# Patient Record
Sex: FEMALE | Race: WHITE | NOT HISPANIC OR LATINO | Employment: FULL TIME | URBAN - NONMETROPOLITAN AREA
[De-identification: names, ages, dates, MRNs, and addresses within clinical notes are randomized per-mention and may not be internally consistent; named-entity substitution may affect disease eponyms.]

---

## 2023-09-01 ENCOUNTER — APPOINTMENT (EMERGENCY)
Dept: CT IMAGING | Facility: HOSPITAL | Age: 33
DRG: 125 | End: 2023-09-01
Payer: COMMERCIAL

## 2023-09-01 ENCOUNTER — HOSPITAL ENCOUNTER (INPATIENT)
Facility: HOSPITAL | Age: 33
LOS: 1 days | DRG: 125 | End: 2023-09-03
Attending: EMERGENCY MEDICINE | Admitting: INTERNAL MEDICINE
Payer: COMMERCIAL

## 2023-09-01 DIAGNOSIS — R53.1 ACUTE RIGHT-SIDED WEAKNESS: Primary | ICD-10-CM

## 2023-09-01 DIAGNOSIS — H53.8 BLURRY VISION, BILATERAL: ICD-10-CM

## 2023-09-01 PROBLEM — R29.90 STROKE-LIKE SYMPTOMS: Status: ACTIVE | Noted: 2023-09-01

## 2023-09-01 LAB
2HR DELTA HS TROPONIN: 0 NG/L
ANION GAP SERPL CALCULATED.3IONS-SCNC: 7 MMOL/L
APTT PPP: 27 SECONDS (ref 23–37)
BUN SERPL-MCNC: 8 MG/DL (ref 5–25)
CALCIUM SERPL-MCNC: 9.6 MG/DL (ref 8.4–10.2)
CARDIAC TROPONIN I PNL SERPL HS: 3 NG/L
CARDIAC TROPONIN I PNL SERPL HS: 3 NG/L
CHLORIDE SERPL-SCNC: 103 MMOL/L (ref 96–108)
CO2 SERPL-SCNC: 27 MMOL/L (ref 21–32)
CREAT SERPL-MCNC: 0.63 MG/DL (ref 0.6–1.3)
CRP SERPL QL: 6.6 MG/L
ERYTHROCYTE [DISTWIDTH] IN BLOOD BY AUTOMATED COUNT: 12.9 % (ref 11.6–15.1)
FLUAV RNA RESP QL NAA+PROBE: NEGATIVE
FLUBV RNA RESP QL NAA+PROBE: NEGATIVE
GFR SERPL CREATININE-BSD FRML MDRD: 119 ML/MIN/1.73SQ M
GLUCOSE SERPL-MCNC: 107 MG/DL (ref 65–140)
GLUCOSE SERPL-MCNC: 96 MG/DL (ref 65–140)
HCT VFR BLD AUTO: 47.7 % (ref 34.8–46.1)
HGB BLD-MCNC: 15.1 G/DL (ref 11.5–15.4)
INR PPP: 0.88 (ref 0.84–1.19)
MCH RBC QN AUTO: 29.4 PG (ref 26.8–34.3)
MCHC RBC AUTO-ENTMCNC: 31.7 G/DL (ref 31.4–37.4)
MCV RBC AUTO: 93 FL (ref 82–98)
PLATELET # BLD AUTO: 234 THOUSANDS/UL (ref 149–390)
PMV BLD AUTO: 10 FL (ref 8.9–12.7)
POTASSIUM SERPL-SCNC: 3.7 MMOL/L (ref 3.5–5.3)
PROTHROMBIN TIME: 12.2 SECONDS (ref 11.6–14.5)
RBC # BLD AUTO: 5.13 MILLION/UL (ref 3.81–5.12)
RSV RNA RESP QL NAA+PROBE: NEGATIVE
SARS-COV-2 RNA RESP QL NAA+PROBE: NEGATIVE
SODIUM SERPL-SCNC: 137 MMOL/L (ref 135–147)
TSH SERPL DL<=0.05 MIU/L-ACNC: 2.06 UIU/ML (ref 0.45–4.5)
WBC # BLD AUTO: 9.01 THOUSAND/UL (ref 4.31–10.16)

## 2023-09-01 PROCEDURE — 70498 CT ANGIOGRAPHY NECK: CPT

## 2023-09-01 PROCEDURE — 99285 EMERGENCY DEPT VISIT HI MDM: CPT | Performed by: EMERGENCY MEDICINE

## 2023-09-01 PROCEDURE — 99285 EMERGENCY DEPT VISIT HI MDM: CPT

## 2023-09-01 PROCEDURE — 85610 PROTHROMBIN TIME: CPT

## 2023-09-01 PROCEDURE — 99223 1ST HOSP IP/OBS HIGH 75: CPT | Performed by: NURSE PRACTITIONER

## 2023-09-01 PROCEDURE — 80048 BASIC METABOLIC PNL TOTAL CA: CPT

## 2023-09-01 PROCEDURE — 36415 COLL VENOUS BLD VENIPUNCTURE: CPT

## 2023-09-01 PROCEDURE — 0241U HB NFCT DS VIR RESP RNA 4 TRGT: CPT

## 2023-09-01 PROCEDURE — 84443 ASSAY THYROID STIM HORMONE: CPT | Performed by: NURSE PRACTITIONER

## 2023-09-01 PROCEDURE — 84484 ASSAY OF TROPONIN QUANT: CPT

## 2023-09-01 PROCEDURE — 82607 VITAMIN B-12: CPT | Performed by: NURSE PRACTITIONER

## 2023-09-01 PROCEDURE — 85027 COMPLETE CBC AUTOMATED: CPT

## 2023-09-01 PROCEDURE — 82948 REAGENT STRIP/BLOOD GLUCOSE: CPT

## 2023-09-01 PROCEDURE — 86140 C-REACTIVE PROTEIN: CPT | Performed by: NURSE PRACTITIONER

## 2023-09-01 PROCEDURE — 93005 ELECTROCARDIOGRAM TRACING: CPT

## 2023-09-01 PROCEDURE — 70496 CT ANGIOGRAPHY HEAD: CPT

## 2023-09-01 PROCEDURE — 85730 THROMBOPLASTIN TIME PARTIAL: CPT

## 2023-09-01 RX ORDER — ATORVASTATIN CALCIUM 40 MG/1
40 TABLET, FILM COATED ORAL EVERY EVENING
Status: DISCONTINUED | OUTPATIENT
Start: 2023-09-02 | End: 2023-09-03

## 2023-09-01 RX ORDER — SODIUM CHLORIDE 9 MG/ML
75 INJECTION, SOLUTION INTRAVENOUS CONTINUOUS
Status: DISCONTINUED | OUTPATIENT
Start: 2023-09-01 | End: 2023-09-02

## 2023-09-01 RX ORDER — ASPIRIN 81 MG/1
81 TABLET, CHEWABLE ORAL DAILY
Status: DISCONTINUED | OUTPATIENT
Start: 2023-09-02 | End: 2023-09-03

## 2023-09-01 RX ORDER — ENOXAPARIN SODIUM 100 MG/ML
40 INJECTION SUBCUTANEOUS DAILY
Status: DISCONTINUED | OUTPATIENT
Start: 2023-09-02 | End: 2023-09-03 | Stop reason: HOSPADM

## 2023-09-01 RX ADMIN — SODIUM CHLORIDE 75 ML/HR: 0.9 INJECTION, SOLUTION INTRAVENOUS at 22:32

## 2023-09-01 NOTE — QUICK NOTE
Stroke Alert Note   Issac Liriano 34 y.o. female  MRN: [id number]   Unit/Bed#: ED 03 Encounter: [id number]     Stroke alert text received at: 6:10pm  Call from  received at: 6:11pm  Neurology response by phone was immediate    33F with no significant medical history on no home medications presented as a stroke alert due to new onset bilateral blurry vision and right-sided weakness. In the ED she reported that she developed blurry vision while at work suddenly around 6 AM.  The seem to involve the entirety of her vision in both eyes, which she initially blamed on eyestrain looking at the computer screen for too long and fatigue. Then, around 3pm developed sudden onset right arm weakness/heaviness and the right side of her face felt weird. She did not initially report any symptoms in her legs, but her sister reported that patient tripped on the way into the ED. Prior to 11 AM she had a normal day, and has been in her usual state of health with no recent illnesses or injuries. Exam by the ED provider found that her pupils were equal, round, and reactive, visual fields were full with eyes tested separately and together. Visual acuity was 20/50 with both eyes open, and 20/70 on the right and left with each eye tested independently. She was also found to have mild weakness in the right arm including , with mild drift. The rest of her exam was reported to be normal.  BP on arrival was 141/97. She reports no change in her symptoms since they began. Per the ED provider she denied any eye pain, pain with eye movements, light sensitivity, or any other eye/ocular symptoms. She did endorse a mild headache, however. She does not take any medications normally, nor has she taken any this week. CBC, BMP, coags, were normal.  CRP mildly elevated at 6.6    NIHSSS 1 for right arm drift    CT head wo: Normal, no acute findings. CTA head/neck: Normal, no significant stenosis.     IV TNK was not given due to being outside the window for administration, and her symptoms were not clearly due to stroke. Based on the sudden onset of her symptoms, first the blurry vision, and then several hours later the right-sided weakness, stroke needs to be considered, but the modest loss of visual bilateral acuity would be atypical for vision loss due to ischemia, which is typically severe. MS could be considered, but symptoms in MS are not sudden onset like this. This degree and description of vision loss seems to be more consistent with than ocular/ophthalmic etiology. I did discuss her case with an on-call ophthalmologist, who suggested that new onset diabetes could cause similar vision loss, however, her glucose was normal.  - admit for additional work-up, but she may need to be transferred for ophthalmology evaluation if her vision worsens, and if not, she will likely need to be seen by ophthalmology ASAP after discharge. - Admit on stroke pathway  - Recommend loading with aspirin 325mg once, then continuing 81 mg daily,  - start lipitor 40mg Qday  - MRI brain and orbits w/wo, echo, lipid panel, HbA1c  - permissive HTN to 220/110 for 24 hours, monitor on telemetry  - normothermia, euglycemia  - avoid hypotonic fluids.       Chet Gonzalez MD

## 2023-09-01 NOTE — ED PROVIDER NOTES
History  Chief Complaint   Patient presents with   • STROKE Alert     1100 started with blurred vision and weakness around 1500hr. Stroke alert called at 200     68-year-old female with no past medical history presenting to the emergency room with chief complaint of reporting what she describes as "blurry vision" around 11 AM while at work today. Patient was working on a computer screen. She thought she was just "tired."  She tried to work on papers but had difficulty doing that as well. Around 3 PM she started to notice that she was having right arm heaviness and "weakness."  She also report that the right side of her face felt what she describes as "weird."  Her sister who accompanied her to the emergency room reports that the patient did stumble coming into the emergency department. She denies any recent illness or recent head trauma. She does report previous history of concussion. No chest pain or shortness of breath. No reported fevers or chills. History provided by:  Patient and relative  STROKE Alert  Location:  Blurry vision and right arm weakness  Severity:  Severe  Associated symptoms: no fatigue, no fever and no headaches        None       No past medical history on file. No past surgical history on file. No family history on file. I have reviewed and agree with the history as documented. No existing history information found. No existing history information found. Review of Systems   Constitutional: Negative. Negative for fatigue and fever. Eyes: Positive for visual disturbance. Negative for photophobia and pain. Respiratory: Negative. Cardiovascular: Negative. Gastrointestinal: Negative. Neurological: Positive for weakness and numbness. Negative for dizziness, seizures, syncope, facial asymmetry, speech difficulty, light-headedness and headaches. All other systems reviewed and are negative.       Physical Exam  Physical Exam  Vitals and nursing note reviewed. Constitutional:       General: She is awake. Appearance: Normal appearance. She is well-developed. She is obese. She is not ill-appearing or toxic-appearing. HENT:      Head: Normocephalic and atraumatic. Right Ear: External ear normal.      Left Ear: External ear normal.      Nose: Nose normal.      Mouth/Throat:      Mouth: Mucous membranes are moist.   Eyes:      General: Lids are normal. No scleral icterus. Extraocular Movements: Extraocular movements intact. Pupils: Pupils are equal, round, and reactive to light. Slit lamp exam:     Right eye: Anterior chamber quiet. Left eye: Anterior chamber quiet. Visual Fields: Right eye visual fields normal and left eye visual fields normal.      Comments: Visual acuity:  OS: 20/70  OD: 20/70  OU: 20/50   Cardiovascular:      Rate and Rhythm: Normal rate and regular rhythm. Heart sounds: Normal heart sounds. No murmur heard. Pulmonary:      Effort: Pulmonary effort is normal. No respiratory distress. Breath sounds: Normal breath sounds. No wheezing, rhonchi or rales. Abdominal:      General: Abdomen is flat. There is no distension. Palpations: Abdomen is soft. Tenderness: There is no abdominal tenderness. There is no guarding or rebound. Musculoskeletal:         General: No swelling, tenderness or deformity. Normal range of motion. Cervical back: Normal range of motion and neck supple. Skin:     General: Skin is warm and dry. Coloration: Skin is not jaundiced or pale. Findings: No rash. Neurological:      Mental Status: She is alert and oriented to person, place, and time.       Comments: See NIH   Psychiatric:         Attention and Perception: Attention normal.         Mood and Affect: Mood normal.         Speech: Speech normal.         Behavior: Behavior normal.         Vital Signs  ED Triage Vitals [09/01/23 1809]   Temperature Pulse Respirations Blood Pressure SpO2   97.7 °F (36.5 °C) 102 18 141/97 98 %      Temp Source Heart Rate Source Patient Position - Orthostatic VS BP Location FiO2 (%)   Temporal Monitor Sitting Left arm --      Pain Score       No Pain           Vitals:    09/01/23 1845 09/01/23 1900 09/01/23 1915 09/01/23 1930   BP: 131/74 120/75 126/70 122/72   Pulse: 80 76 73 77   Patient Position - Orthostatic VS:             Visual Acuity  Visual Acuity    Flowsheet Row Most Recent Value   Visual acuity R eye is --  [20/400]   Visual acuity Left eye is 20/200   Wearing corrective eyewear/lenses? No   L Pupil Size (mm) 3   R Pupil Size (mm) 3          ED Medications  Medications - No data to display    Diagnostic Studies  Results Reviewed     Procedure Component Value Units Date/Time    HS Troponin I 4hr [[id number]]     Lab Status: No result Specimen: Blood     FLU/RSV/COVID - if FLU/RSV clinically relevant [[id number]]  (Normal) Collected: 09/01/23 1823    Lab Status: Final result Specimen: Nares from Nose Updated: 09/01/23 1908     SARS-CoV-2 Negative     INFLUENZA A PCR Negative     INFLUENZA B PCR Negative     RSV PCR Negative    Narrative:      FOR PEDIATRIC PATIENTS - copy/paste COVID Guidelines URL to browser: [email or web address]    SARS-CoV-2 assay is a Nucleic Acid Amplification assay intended for the  qualitative detection of nucleic acid from SARS-CoV-2 in nasopharyngeal  swabs. Results are for the presumptive identification of SARS-CoV-2 RNA. Positive results are indicative of infection with SARS-CoV-2, the virus  causing COVID-19, but do not rule out bacterial infection or co-infection  with other viruses. Laboratories within the [facility] and its  territories are required to report all positive results to the appropriate  public health authorities. Negative results do not preclude SARS-CoV-2  infection and should not be used as the sole basis for treatment or other  patient management decisions. Negative results must be combined with  clinical observations, patient history, and epidemiological information. This test has not been FDA cleared or approved. This test has been authorized by FDA under an Emergency Use Authorization  (EUA). This test is only authorized for the duration of time the  declaration that circumstances exist justifying the authorization of the  emergency use of an in vitro diagnostic tests for detection of SARS-CoV-2  virus and/or diagnosis of COVID-19 infection under section 564(b)(1) of  the Act, 21 U. S.C. 761CZS-7(V)(9), unless the authorization is terminated  or revoked sooner. The test has been validated but independent review by FDA  and CLIA is pending. Test performed using [employer] Gene[employer]pert: This RT-PCR assay targets N2,  a region unique to SARS-CoV-2. A conserved region in the E-gene was chosen  for pan-Sarbecovirus detection which includes SARS-CoV-2. According to CMS-2020-01-R, this platform meets the definition of high-throughput technology.     HS Troponin I 2hr [[id number]]     Lab Status: No result Specimen: Blood     HS Troponin 0hr (reflex protocol) [[id number]]  (Normal) Collected: 09/01/23 1821    Lab Status: Final result Specimen: Blood from Arm, Left Updated: 09/01/23 1855     hs TnI 0hr 3 ng/L     Fingerstick Glucose (POCT) [[id number]]  (Normal) Collected: 09/01/23 1822    Lab Status: Final result Updated: 09/01/23 1849     POC Glucose 107 mg/dl     Basic metabolic panel [[id number]] Collected: 09/01/23 1821    Lab Status: Final result Specimen: Blood from Arm, Left Updated: 09/01/23 1848     Sodium 137 mmol/L      Potassium 3.7 mmol/L      Chloride 103 mmol/L      CO2 27 mmol/L      ANION GAP 7 mmol/L      BUN 8 mg/dL      Creatinine 0.63 mg/dL      Glucose 96 mg/dL      Calcium 9.6 mg/dL      eGFR 119 ml/min/1.73sq m     Narrative:      [place] guidelines for Chronic Kidney Disease (CKD):   •  Stage 1 with normal or high GFR (GFR > 90 mL/min/1.73 square meters)  •  Stage 2 Mild CKD (GFR = 60-89 mL/min/1.73 square meters)  •  Stage 3A Moderate CKD (GFR = 45-59 mL/min/1.73 square meters)  •  Stage 3B Moderate CKD (GFR = 30-44 mL/min/1.73 square meters)  •  Stage 4 Severe CKD (GFR = 15-29 mL/min/1.73 square meters)  •  Stage 5 End Stage CKD (GFR <15 mL/min/1.73 square meters)  Note: GFR calculation is accurate only with a steady state creatinine    Protime-INR [[id number]]  (Normal) Collected: 09/01/23 1821    Lab Status: Final result Specimen: Blood from Arm, Left Updated: 09/01/23 1844     Protime 12.2 seconds      INR 0.88    APTT [[id number]]  (Normal) Collected: 09/01/23 1821    Lab Status: Final result Specimen: Blood from Arm, Left Updated: 09/01/23 1844     PTT 27 seconds     CBC and Platelet [[id number]]  (Abnormal) Collected: 09/01/23 1821    Lab Status: Final result Specimen: Blood from Arm, Left Updated: 09/01/23 1831     WBC 9.01 Thousand/uL      RBC 5.13 Million/uL      Hemoglobin 15.1 g/dL      Hematocrit 47.7 %      MCV 93 fL      MCH 29.4 pg      MCHC 31.7 g/dL      RDW 12.9 %      Platelets 395 Thousands/uL      MPV 10.0 fL                  CTA stroke alert (head/neck)   Final Result by Vani Harding DO (09/01 1834)      No carotid or vertebral artery stenosis         No focal intracranial stenosis or aneurysm. Findings were directly communicated with Shankar Edouard via [facility] text at 6:32 p.m. on 9/1/2023. Workstation performed: [id number]         CT stroke alert brain   Final Result by Vani Harding DO (09/01 1817)      No acute intracranial abnormality. Findings were directly discussed with Shankar Edouard at 6:15 p.m. on 9/1/2023.       Workstation performed: [id number]                    Procedures  ECG 12 Lead Documentation Only    Date/Time: 9/1/2023 6:45 PM    Performed by: Jenfier Cid DO  Authorized by: Jenifer Cid DO    Indications / Diagnosis:  Stroke alert  ECG reviewed by me, the ED Provider: yes    Patient location:  ED  Previous ECG:     Previous ECG:  Unavailable  Interpretation:     Interpretation: normal    Rate:     ECG rate assessment: normal    Rhythm:     Rhythm: sinus rhythm    Ectopy:     Ectopy: none    QRS:     QRS axis:  Normal  Conduction:     Conduction: normal    ST segments:     ST segments:  Normal  T waves:     T waves: normal               ED Course  ED Course as of 09/01/23 2006   Fri Sep 01, 2023   1816 Discussed with Dr. Martir Gilmore. 1923 CT imaging has been negative. NIH stroke scale is 1. Consulting with neurology. 2004 Dr. Martir Gilmore is consulting with ophthalmology. Marilia the case with medicine. They will admit the patient to their service. 2004 Patient is currently having no change in her symptomatology. Stroke Assessment     Row Name 09/01/23 1830             NIH Stroke Scale    Interval Baseline      Level of Consciousness (1a.) 0      LOC Questions (1b.) 0      LOC Commands (1c.) 0      Best Gaze (2.) 0      Visual (3.) 0      Facial Palsy (4.) 0      Motor Arm, Left (5a.) 0      Motor Arm, Right (5b.) 1      Motor Leg, Left (6a.) 0      Motor Leg, Right (6b.) 0      Limb Ataxia (7.) 0      Sensory (8.) 0      Best Language (9.) 0      Dysarthria (10.) 0      Extinction and Inattention (11.) (Formerly Neglect) 0      Total 1              Flowsheet Row Most Recent Value   Thrombolytic Decision Options    Thrombolytic Decision Patient not a candidate. Patient is not a candidate options Unclear time of onset outside appropriate time window. Medical Decision Making  Patient presented to the emergency department and a MSE was performed. The patient was evaluated for complaint related to acute strokelike symptoms.   Patient is potentially at risk for, but not limited to, thrombotic stroke, embolic stroke, hemorrhagic stroke, hypertensive emergency, hypoglycemic episode, or migraine variant. Several of these diagnoses have been evaluated and ruled out by history and physical.  As needed, patient will be further evaluated with laboratory and imaging studies. Higher level diagnostics, such as CT imaging or ultrasound, may also be required. Please see work-up portion of the note for further evaluation of patient's risk. Socioeconomic factors were also considered as part of the decision-making process. Unless otherwise stated in the chart or patient is admitted as elsewhere documented, any previously prescribed medications will be maintained. Acute right-sided weakness: complicated acute illness or injury with systemic symptoms  Blurry vision, bilateral: complicated acute illness or injury with systemic symptoms  Amount and/or Complexity of Data Reviewed  Labs: ordered. Risk  Decision regarding hospitalization. Risk Details: Patient presented to the emergency department and a MSE was performed. The patient was evaluated and diagnosed with acute blurred vision and right upper extremity weakness. This is a new issue that will require additional planned work-up and treatment in a hospitalized setting. As may have been required as part of this evaluation, clinical laboratory test, radiology imaging and medical testing (I.e. EKG) were ordered as necessitated by the patient's presentation. I independently reviewed these studies, imaging and testing. This patient's case is considered to be a considerable risk secondary to the above listed disease process and poses a threat to the patient's well-being and baseline function. Further in-patient diagnostic testing and management, which may include the administration of parenteral medications, is required.           Disposition  Final diagnoses:   Acute right-sided weakness   Blurry vision, bilateral     Time reflects when diagnosis was documented in both MDM as applicable and the Disposition within this note     Time User Action Codes Description Comment    9/1/2023  6:16 PM Bart Shorts Add [R53.1] Acute right-sided weakness     9/1/2023  6:32 PM Bart Shorts Add [H53.8] Blurry vision, bilateral       ED Disposition     ED Disposition   Admit    Condition   Stable    Date/Time   Fri Sep 1, 2023  8:04 PM    Comment   --         Follow-up Information    None         Patient's Medications    No medications on file       No discharge procedures on file.     PDMP Review     None          ED Provider  Electronically Signed by           Adolph Walker DO  09/01/23 2006

## 2023-09-02 LAB
25(OH)D3 SERPL-MCNC: 15.4 NG/ML (ref 30–100)
ALBUMIN SERPL BCP-MCNC: 3.7 G/DL (ref 3.5–5)
ALP SERPL-CCNC: 67 U/L (ref 34–104)
ALT SERPL W P-5'-P-CCNC: 25 U/L (ref 7–52)
ANION GAP SERPL CALCULATED.3IONS-SCNC: 5 MMOL/L
AST SERPL W P-5'-P-CCNC: 26 U/L (ref 13–39)
BASOPHILS # BLD AUTO: 0.02 THOUSANDS/ÂΜL (ref 0–0.1)
BASOPHILS NFR BLD AUTO: 0 % (ref 0–1)
BILIRUB SERPL-MCNC: 0.53 MG/DL (ref 0.2–1)
BUN SERPL-MCNC: 9 MG/DL (ref 5–25)
CALCIUM SERPL-MCNC: 8.6 MG/DL (ref 8.4–10.2)
CHLORIDE SERPL-SCNC: 107 MMOL/L (ref 96–108)
CHOLEST SERPL-MCNC: 182 MG/DL
CO2 SERPL-SCNC: 26 MMOL/L (ref 21–32)
CREAT SERPL-MCNC: 0.67 MG/DL (ref 0.6–1.3)
EOSINOPHIL # BLD AUTO: 0.22 THOUSAND/ÂΜL (ref 0–0.61)
EOSINOPHIL NFR BLD AUTO: 3 % (ref 0–6)
ERYTHROCYTE [DISTWIDTH] IN BLOOD BY AUTOMATED COUNT: 13.2 % (ref 11.6–15.1)
GFR SERPL CREATININE-BSD FRML MDRD: 117 ML/MIN/1.73SQ M
GLUCOSE SERPL-MCNC: 95 MG/DL (ref 65–140)
HCG SERPL QL: NEGATIVE
HCT VFR BLD AUTO: 40 % (ref 34.8–46.1)
HDLC SERPL-MCNC: 34 MG/DL
HGB BLD-MCNC: 12.5 G/DL (ref 11.5–15.4)
IMM GRANULOCYTES # BLD AUTO: 0.01 THOUSAND/UL (ref 0–0.2)
IMM GRANULOCYTES NFR BLD AUTO: 0 % (ref 0–2)
LDLC SERPL CALC-MCNC: 117 MG/DL (ref 0–100)
LYMPHOCYTES # BLD AUTO: 1.75 THOUSANDS/ÂΜL (ref 0.6–4.47)
LYMPHOCYTES NFR BLD AUTO: 27 % (ref 14–44)
MAGNESIUM SERPL-MCNC: 2.1 MG/DL (ref 1.9–2.7)
MCH RBC QN AUTO: 29.3 PG (ref 26.8–34.3)
MCHC RBC AUTO-ENTMCNC: 31.3 G/DL (ref 31.4–37.4)
MCV RBC AUTO: 94 FL (ref 82–98)
MONOCYTES # BLD AUTO: 0.35 THOUSAND/ÂΜL (ref 0.17–1.22)
MONOCYTES NFR BLD AUTO: 5 % (ref 4–12)
NEUTROPHILS # BLD AUTO: 4.14 THOUSANDS/ÂΜL (ref 1.85–7.62)
NEUTS SEG NFR BLD AUTO: 65 % (ref 43–75)
NRBC BLD AUTO-RTO: 0 /100 WBCS
PLATELET # BLD AUTO: 193 THOUSANDS/UL (ref 149–390)
PMV BLD AUTO: 10.1 FL (ref 8.9–12.7)
POTASSIUM SERPL-SCNC: 3.8 MMOL/L (ref 3.5–5.3)
PROT SERPL-MCNC: 6.2 G/DL (ref 6.4–8.4)
RBC # BLD AUTO: 4.26 MILLION/UL (ref 3.81–5.12)
SODIUM SERPL-SCNC: 138 MMOL/L (ref 135–147)
TRIGL SERPL-MCNC: 157 MG/DL
VIT B12 SERPL-MCNC: 222 PG/ML (ref 180–914)
WBC # BLD AUTO: 6.49 THOUSAND/UL (ref 4.31–10.16)

## 2023-09-02 PROCEDURE — 84703 CHORIONIC GONADOTROPIN ASSAY: CPT | Performed by: NURSE PRACTITIONER

## 2023-09-02 PROCEDURE — 83036 HEMOGLOBIN GLYCOSYLATED A1C: CPT | Performed by: NURSE PRACTITIONER

## 2023-09-02 PROCEDURE — 97163 PT EVAL HIGH COMPLEX 45 MIN: CPT

## 2023-09-02 PROCEDURE — 82306 VITAMIN D 25 HYDROXY: CPT | Performed by: NURSE PRACTITIONER

## 2023-09-02 PROCEDURE — 83735 ASSAY OF MAGNESIUM: CPT | Performed by: NURSE PRACTITIONER

## 2023-09-02 PROCEDURE — 80061 LIPID PANEL: CPT | Performed by: NURSE PRACTITIONER

## 2023-09-02 PROCEDURE — 99232 SBSQ HOSP IP/OBS MODERATE 35: CPT | Performed by: INTERNAL MEDICINE

## 2023-09-02 PROCEDURE — 97167 OT EVAL HIGH COMPLEX 60 MIN: CPT

## 2023-09-02 PROCEDURE — 85025 COMPLETE CBC W/AUTO DIFF WBC: CPT | Performed by: NURSE PRACTITIONER

## 2023-09-02 PROCEDURE — 80053 COMPREHEN METABOLIC PANEL: CPT | Performed by: NURSE PRACTITIONER

## 2023-09-02 RX ORDER — ACETAMINOPHEN 325 MG/1
650 TABLET ORAL EVERY 6 HOURS PRN
Status: DISCONTINUED | OUTPATIENT
Start: 2023-09-02 | End: 2023-09-03 | Stop reason: HOSPADM

## 2023-09-02 RX ADMIN — ENOXAPARIN SODIUM 40 MG: 40 INJECTION SUBCUTANEOUS at 09:59

## 2023-09-02 RX ADMIN — ACETAMINOPHEN 650 MG: 325 TABLET, FILM COATED ORAL at 21:53

## 2023-09-02 RX ADMIN — ATORVASTATIN CALCIUM 40 MG: 40 TABLET, FILM COATED ORAL at 17:03

## 2023-09-02 RX ADMIN — ASPIRIN 81 MG 81 MG: 81 TABLET ORAL at 09:58

## 2023-09-02 NOTE — OCCUPATIONAL THERAPY NOTE
Occupational Therapy Evaluation     Patient Name: Mohinder Royal  VBWIL'U Date: 9/2/2023  Problem List  Principal Problem:    Stroke-like symptoms    Past Medical History  Past Medical History:   Diagnosis Date    Concussion      Past Surgical History  Past Surgical History:   Procedure Laterality Date    KNEE ARTHROSCOPY           09/02/23 3757   Note Type   Note type Evaluation   Pain Assessment   Pain Assessment Tool 0-10   Pain Score No Pain   Home Living   Type of Home House  (Split level- 4 steps up to main floor and 4 steps down to basement)   Home Layout Two level;Bed/bath upstairs   Bathroom Shower/Tub Walk-in shower   Bathroom Toilet Standard   Home Equipment   (No AD PTA)   Additional Comments Pt reporting living in split level home. Pt not using AD for mobility PTA. Prior Function   Level of [place] Independent with ADLs; Independent with functional mobility; Independent with IADLS   Lives With Spouse  (3 children)   Receives Help From Family   IADLs Independent with driving; Independent with meal prep   Falls in the last 6 months 0   Vocational Full time employment   Comments Pt IND with ADLs, IADLs, transfers, mobility, driving and working full time. ADL   LB Dressing Assistance 5  Supervision/Setup   LB Dressing Deficit Setup;Supervision/safety; Don/doff R sock; Don/doff L sock; Increased time to complete   Additional Comments Pt able to don socks while sitting EOB with SUP for safety during distal reach. Based on functional abilities assessed, pt demo ability to complete UB ADLs with setup and LB ADLs with SUP. Bed Mobility   Supine to Sit 7  Independent   Additional Comments Pt completed supine to sit bed mobility with HOB flat and no use of bed rails IND. Transfers   Sit to Stand 5  Supervision   Stand to Sit 5  Supervision   Stand pivot 5  Supervision   Additional Comments Pt completed all functional transfers without use of AD and SUP for safety due to R sided weakness.    Balance Static Sitting Normal   Dynamic Sitting Normal   Static Standing Good   Dynamic Standing Fair +   Activity Tolerance   Activity Tolerance Patient tolerated treatment well   Nurse Made Aware RN   RUE Assessment   RUE Assessment WFL   RUE Strength   RUE Overall Strength Deficits  (3+/5 BUE strength. Noticable deficits at all joints in comparison to LUE. Adequate strength to complete ADLs.)   LUE Assessment   LUE Assessment WFL   LUE Strength   LUE Overall Strength Within Functional Limits - able to perform ADL tasks with strength  (4/5 MMT)   Hand Function   Hand Function Comments Pt presenting with decreased strength and [place] in R hand. Sensation   Light Touch No apparent deficits   Vision-Basic Assessment   Current Vision Wears glasses only for reading   Vision - Complex Assessment   Ocular Range of Motion Intact   Tracking Intact   Convergence   (WFL)   Visual Fields   [facility])   Acuity Able to read clock/calendar on wall without difficulty   Additional Comments Pt continues to report "blurred" vision in both eyes but has noticed some improvement. ROM, tacking, peripheral vision, visual fields WFL. Perception   Inattention/Neglect Appears intact   Cognition   Overall Cognitive Status WFL   Arousal/Participation Alert; Responsive; Cooperative   Attention Within functional limits   Orientation Level Oriented X4   Memory Within functional limits   Following Commands Follows all commands and directions without difficulty   Assessment   Limitation Decreased ADL status; Decreased UE strength;Decreased endurance;Decreased high-level ADLs; Decreased self-care trans   Prognosis Good   Assessment Pt is a 34 y.o. female, admitted to [address] [place] [address] 9/1/2023 d/t experiencing blurred vision, numbness of her nose, metallic taste in mouth, R sided favial and UE weakness and heaviness. Dx: stroke-like symptoms. Pt with PMHx impacting their performance during ADL tasks, including: concussion.  Prior to admission to the hospital Pt was performing ADLs without physical assistance. IADLs without physical assistance. Functional transfers/ambulation without physical assistance. Cognitive status was PTA was WNL. OT order placed to assess Pt's ADLs, cognitive status, and performance during functional tasks in order to maximize safety and independence while making most appropriate d/c recommendations. Pt's clinical presentation is currently unstable/unpredictable given new onset deficits that effect Pt's occupational performance and ability to safely return to PLOF including decrease activity tolerance, decrease standing balance, decrease performance during ADL tasks, decrease UB MS, decrease generalized strength, decrease performance during functional transfers, decrease FM control and steps to enter home. Personal factors affecting Pt at time of initial evaluation include: step(s) to enter environment, multi-level environment, inability to perform current job functions, inability to perform IADLs and inability to perform ADLs. Pt will benefit from continued skilled OT services to address deficits as defined above and to maximize level independence/participation during ADLs and functional tasks to facilitate return toward PLOF and improved quality of life. From an occupational therapy standpoint, recommendation at time of d/c would be outpatient OT if visual and/or R sided weakness symptoms do not resolve. Plan   Treatment Interventions ADL retraining;Functional transfer training;UE strengthening/ROM; Endurance training;Patient/family training;Neuromuscular reeducation; Fine motor coordination activities; Energy conservation; Activityengagement   Goal Expiration Date 09/16/23   OT Frequency 1-2x/wk   Recommendation   OT Discharge Recommendation Home with outpatient rehabilitation  (Recommend outpatient OT if strength and visual deficits do not resolve by discharge. If symptoms resolve, no rehab needs. )   AM-PAC Daily Activity Inpatient   Lower Body Dressing 3 Bathing 3   Toileting 3   Upper Body Dressing 4   Grooming 4   Eating 4   Daily Activity Raw Score 21   Daily Activity Standardized Score (Calc for Raw Score >=11) 44.27   AM-PAC Applied Cognition Inpatient   Following a Speech/Presentation 4   Understanding Ordinary Conversation 4   Taking Medications 4   Remembering Where Things Are Placed or Put Away 4   Remembering List of 4-5 Errands 4   Taking Care of Complicated Tasks 4   Applied Cognition Raw Score 24   Applied Cognition Standardized Score 62.21     The patient's raw score on the AM-PAC Daily Activity inpatient short form is 21, standardized score is 44.27, greater than 39.4. Patients at this level are likely to benefit from DC to home. Please refer to the recommendation of the Occupational Therapist for safe DC planning. Pt goals to be met by 9/16/23. Pt will demonstrate ability to complete UB ADLs including washing/dressing @ IND in order to increase performance and participation during meaningful tasks  Pt will demonstrate ability to complete LB dressing @ IND in order to increase safety and independence during meaningful tasks. Pt will demonstrate ability to elijah/doff socks/shoes while sitting EOB @ IND in order to increase safety and independence during meaningful tasks. Pt will demonstrate ability to complete toileting tasks including CM and pericare @ IND in order to increase safety and independence during meaningful tasks. Pt will demonstrate ability to complete EOB, chair, toilet/commode transfers @ IND in order to increase performance and participation during functional tasks. Pt will demonstrate ability to stand for 10 minutes while maintaining G balance without use of AD for UB support PRN. Pt will demonstrate ability to tolerate 30-35 minute OT session with no vc'ing for deep breathing or use of energy conservation techniques in order to increase activity tolerance during functional tasks.    Pt will demonstrate Good carryover of use of energy conservation/compensatory strategies during ADLs and functional tasks in order to increase safety and reduce risk for falls. Pt will demonstrate Good attention and participation in continued evaluation of functional ambulation house hold distances in order to assist with safe d/c planning. Pt will identify 3 areas of interest/hobbies and 1 intervention on how to incorporate into daily life in order to increase interaction with environment and peers as well as increase participation in meaningful tasks. Pt will demonstrate 100% carryover of BUE HEP in order to increase BUE MS and increase performance during functional tasks upon d/c home.     Heriberto Parra, OTR/L

## 2023-09-02 NOTE — PROGRESS NOTES
[facility]  Progress Note  Name: Yair Maldonado I  MRN: [id number]  Unit/Bed#: -01 I Date of Admission: 9/1/2023   Date of Service: 9/2/2023 I Hospital Day: 0    Assessment/Plan   * Stroke-like symptoms  Assessment & Plan  • POA beginning at 11 AM developed bilateral blurred vision while looking at computer while at work then at 3 PM developed nose numbness, metallic taste in mouth followed by right-sided facial heaviness and right upper extremity weakness. Right upper extremity weakness and blurred vision persist at time of admission. • Admit to Stroke Pathway  • NIH 1- No TNK per neurology  • CT/CTA brain without ischemia, infarct, bleed, lesion or LVO  • Aspirin 325 mg loaded in ED then 81 mg daily   • Atorvastatin 40 mg daily  • Lipid panel reviewed. • Telemetry monitoring did not reveal any tacky or bradycardia arrhythmias. • -200 permissive hypertension  • Follow-up on echocardiogram with bubble study  • Follow-up on Mri brain w/o contrast   • Flp, tsh, hba1c, b12, tsh, vit D noted   • speech/PT/OT eval, case management  • Appreciate formal neurology consultation                 VTE Pharmacologic Prophylaxis:   High Risk (Score >/= 5) - Pharmacological DVT Prophylaxis Ordered: enoxaparin (Lovenox). Sequential Compression Devices Ordered. Patient Centered Rounds: I performed bedside rounds with nursing staff today. Discussions with Specialists or Other Care Team Provider: Discussed with neurology  Education and Discussions with Family / Patient: Patient declined call to .      Total Time Spent on Date of Encounter in care of patient: 35 minutes This time was spent on one or more of the following: performing physical exam; counseling and coordination of care; obtaining or reviewing history; documenting in the medical record; reviewing/ordering tests, medications or procedures; communicating with other healthcare professionals and discussing with patient's family/caregivers. Current Length of Stay: 0 day(s)  Current Patient Status: Observation   Certification Statement: The patient will continue to require additional inpatient hospital stay due to Await stroke work-up  Discharge Plan: Anticipate discharge in 24-48 hrs to home. Code Status: Level 1 - Full Code    Subjective:   Patient seen and examined at bedside. Denies any headache and reports improvement in vision bilaterally. Denies any speech or swallowing difficulty or facial droop. Still with some mild right upper extremity weakness however improved than on admission. Denies any right upper extremity numbness or weakness of any other extremity. Remains hemodynamically stable. Objective:     Vitals:   Temp (24hrs), Av.9 °F (36.6 °C), Min:97.7 °F (36.5 °C), Max:98.1 °F (36.7 °C)    Temp:  [97.7 °F (36.5 °C)-98.1 °F (36.7 °C)] 97.7 °F (36.5 °C)  HR:  [] 79  Resp:  [14-20] 18  BP: (110-141)/(60-97) 112/67  SpO2:  [93 %-100 %] 98 %  Body mass index is 38.09 kg/m². Input and Output Summary (last 24 hours): Intake/Output Summary (Last 24 hours) at 2023 1137  Last data filed at 2023 1005  Gross per 24 hour   Intake 857.5 ml   Output 500 ml   Net 357.5 ml       Physical Exam:   Physical Exam  Constitutional:       General: She is not in acute distress. HENT:      Head: Normocephalic. Nose: Nose normal.      Mouth/Throat:      Mouth: Mucous membranes are moist.   Eyes:      Pupils: Pupils are equal, round, and reactive to light. Cardiovascular:      Rate and Rhythm: Normal rate and regular rhythm. Pulses: Normal pulses. Pulmonary:      Effort: Pulmonary effort is normal.      Breath sounds: Normal breath sounds. Abdominal:      General: Abdomen is flat. Bowel sounds are normal.      Palpations: Abdomen is soft. Musculoskeletal:      Cervical back: Neck supple. Right lower leg: No edema. Skin:     General: Skin is warm.    Neurological:      Mental Status: She is alert and oriented to person, place, and time. Cranial Nerves: No cranial nerve deficit. Sensory: No sensory deficit. Motor: Weakness present. Coordination: Coordination normal.      Gait: Gait abnormal.      Deep Tendon Reflexes: Reflexes normal.      Comments: Mild right upper  extremity weakness   Psychiatric:         Mood and Affect: Mood normal.        Additional Data:     Labs:  Results from last 7 days   Lab Units 09/02/23  0457   WBC Thousand/uL 6.49   HEMOGLOBIN g/dL 12.5   HEMATOCRIT % 40.0   PLATELETS Thousands/uL 193   NEUTROS PCT % 65   LYMPHS PCT % 27   MONOS PCT % 5   EOS PCT % 3     Results from last 7 days   Lab Units 09/02/23  0457   SODIUM mmol/L 138   POTASSIUM mmol/L 3.8   CHLORIDE mmol/L 107   CO2 mmol/L 26   BUN mg/dL 9   CREATININE mg/dL 0.67   ANION GAP mmol/L 5   CALCIUM mg/dL 8.6   ALBUMIN g/dL 3.7   TOTAL BILIRUBIN mg/dL 0.53   ALK PHOS U/L 67   ALT U/L 25   AST U/L 26   GLUCOSE RANDOM mg/dL 95     Results from last 7 days   Lab Units 09/01/23  1821   INR  0.88     Results from last 7 days   Lab Units 09/01/23  1822   POC GLUCOSE mg/dl 107               Lines/Drains:  Invasive Devices     Peripheral Intravenous Line  Duration           Peripheral IV 09/01/23 Left Antecubital <1 day                  Telemetry:  Telemetry Orders (From admission, onward)             24 Hour Telemetry Monitoring  Continuous x 24 Hours (Telem)        Question:  Reason for 24 Hour Telemetry  Answer:  TIA/Suspected CVA/ Confirmed CVA                 Telemetry Reviewed: Normal Sinus Rhythm  Indication for Continued Telemetry Use: No indication for continued use. Will discontinue.               Imaging: Reviewed radiology reports from this admission including: CT head    Recent Cultures (last 7 days):         Last 24 Hours Medication List:   Current Facility-Administered Medications   Medication Dose Route Frequency Provider Last Rate   • aspirin  81 mg Oral Daily Krissy Small CRNP     • atorvastatin  40 mg Oral QPM Krissy S Geovanny, CRNP     • enoxaparin  40 mg Subcutaneous Daily Krissy S Geovanny, CRNP     • sodium chloride  75 mL/hr Intravenous Continuous Krissy S Geovanny, CRNP Stopped (09/02/23 4766)        Today, Patient Was Seen By: Cristina Wooten MD    **Please Note: This note may have been constructed using a voice recognition system. **

## 2023-09-02 NOTE — ASSESSMENT & PLAN NOTE
• POA beginning at 11 AM developed bilateral blurred vision while looking at computer while at work then at 3 PM developed nose numbness, metallic taste in mouth followed by right-sided facial heaviness and right upper extremity weakness. Right upper extremity weakness and blurred vision persist at time of admission.   • Admit to Stroke Pathway  • NIH 1- No TNK per neurology  • CT/CTA brain without ischemia, infarct, bleed, lesion or LVO  • Aspirin 325 mg loaded in ED then 81 mg daily   • Atorvastatin 40 mg daily  • Telemetry monitoring  • -200 permissive hypertension  • Echocardiogram with bubble study  • Mri brain w/o contrast when able  • Flp, tsh, hba1c, b12, tsh, vit D pending  • Speech/PT/OT eval, case management  • Appreciate formal neurology consultation

## 2023-09-02 NOTE — CASE MANAGEMENT
Case Management Assessment & Discharge Planning Note    Patient name Neptali Page  Location /-31 MRN [id number]  : 1990 Date 2023       Current Admission Date: 2023  Current Admission Diagnosis:Stroke-like symptoms   Patient Active Problem List    Diagnosis Date Noted   • Stroke-like symptoms 2023      LOS (days): 0  Geometric Mean LOS (GMLOS) (days):   Days to GMLOS:     OBJECTIVE:              Current admission status: Observation  Referral Reason:  (ischemic stroke protocol)    Preferred Pharmacy:   [address] [place] [address], [address] [place] [address] [address]  [place] [address]  Phone: [phone] Fax: [phone]    Primary Care Provider: Rachelle Grimaldo    Primary Insurance: BLUE CROSS  Secondary Insurance:     CM met with patient at the bedside,baseline information  was obtained. CM discussed the role of CM in helping the patient develop a discharge plan and assist the patient in carry out their plan. Discussed  In rounds with provider, nursing and CM  Plan is pending at this time, MRI is warranted. Per therapy patient will be good for dc with OP PT services. CM met with patient at the bedside,baseline information  was obtained. CM discussed the role of CM in helping the patient develop a discharge plan and assist the patient in carry out their plan. ASSESSMENT:  Active Health Care Proxies    There are no active Health Care Proxies on file.        Advance Directives  Does patient have a [address]?: No  Was patient offered paperwork?: Yes (declined)  Does patient currently have a Health Care decision maker?: Yes, please see Health Care Proxy section  Does patient have Advance Directives?: No  Was patient offered paperwork?: Yes (declined)  Primary Contact: Triston Gipson Spouse         Readmission Root Cause  30 Day Readmission: No    Patient Information  Admitted from[de-identified] Home  Mental Status: Alert  During Assessment patient was accompanied by: Not accompanied during assessment  Assessment information provided by[de-identified] Patient  Primary Caregiver: Self  Support Systems: Spouse/significant other, Family members  [place] of Residence: [address] [place] [address] do you live in?: [facility] entry access options.  Select all that apply.: Stairs  Number of steps to enter home.: 4  Do the steps have railings?: Yes  Type of Current Residence: Bi-level (4 up and 4 down from the front door)  Upon entering residence, is there a bedroom on the main floor (no further steps)?: Yes  Upon entering residence, is there a bathroom on the main floor (no further steps)?: Yes  In the last 12 months, was there a time when you were not able to pay the mortgage or rent on time?: No  In the last 12 months, how many places have you lived?: 1  In the last 12 months, was there a time when you did not have a steady place to sleep or slept in a shelter (including now)?: No  Homeless/housing insecurity resource given?: No  Living Arrangements: Lives w/ Spouse/significant other, Other (Comment) (Lives with)  Is patient a ?: No    Activities of Daily Living Prior to Admission  Functional Status: Independent  Completes ADLs independently?: Yes  Ambulates independently?: Yes  Does patient use assisted devices?: No  Does patient currently own DME?: No  Does patient have a history of Outpatient Therapy (PT/OT)?: No  Does the patient have a history of Short-Term Rehab?: No  Does patient have a history of HHC?: No  Does patient currently have [facility]?: No         Patient Information Continued  Income Source: Employed  Does patient have prescription coverage?: Yes  Within the past 12 months, you worried that your food would run out before you got the money to buy more.: Never true  Within the past 12 months, the food you bought just didn't last and you didn't have money to get more.: Never true  Food insecurity resource given?: No  Does patient receive dialysis treatments?: No  Does patient have a history of Mental Health Diagnosis?: Yes  Is patient receiving treatment for mental health?:  (NO has anxiety, tried medications, now doing herbal and natural remedies.)  Has patient received inpatient treatment related to mental health in the last 2 years?: No         Means of Transportation  Means of Transport to Appts[de-identified] Drives Self  In the past 12 months, has lack of transportation kept you from medical appointments or from getting medications?: No  In the past 12 months, has lack of transportation kept you from meetings, work, or from getting things needed for daily living?: No  Was application for public transport provided?: No        DISCHARGE DETAILS:    Discharge planning discussed with[de-identified] patient  Freedom of Choice: Yes     CM contacted family/caregiver?: No- see comments (declined)                  [facility]          Is the patient interested in [facility] at discharge?: No    DME Referral Provided  Referral made for DME?: No    Other Referral/Resources/Interventions Provided:  Interventions: Outpatient PT  Referral Comments: Provided list of [facility] for OP PT/OT services. Reviewed with patient, the recommendation was made for OP PT/OT services. Provided patient with list of [facility]'s providers and informed patient they can go to there provider of choice. Treatment Team Recommendation: Home  Discharge Destination Plan[de-identified] Home (Home with oP PT)      CM will continue to follow for any additional needs.

## 2023-09-02 NOTE — PLAN OF CARE
Problem: PAIN - ADULT  Goal: Verbalizes/displays adequate comfort level or baseline comfort level  Description: Interventions:  - Encourage patient to monitor pain and request assistance  - Assess pain using appropriate pain scale  - Administer analgesics based on type and severity of pain and evaluate response  - Implement non-pharmacological measures as appropriate and evaluate response  - Consider cultural and social influences on pain and pain management  - Notify physician/advanced practitioner if interventions unsuccessful or patient reports new pain  Outcome: Progressing     Problem: INFECTION - ADULT  Goal: Absence or prevention of progression during hospitalization  Description: INTERVENTIONS:  - Assess and monitor for signs and symptoms of infection  - Monitor lab/diagnostic results  - Monitor all insertion sites, i.e. indwelling lines, tubes, and drains  - Monitor endotracheal if appropriate and nasal secretions for changes in amount and color  - [place] appropriate cooling/warming therapies per order  - Administer medications as ordered  - Instruct and encourage patient and family to use good hand hygiene technique  - Identify and instruct in appropriate isolation precautions for identified infection/condition  Outcome: Progressing  Goal: Absence of fever/infection during neutropenic period  Description: INTERVENTIONS:  - Monitor WBC    Outcome: Progressing     Problem: SAFETY ADULT  Goal: Patient will remain free of falls  Description: INTERVENTIONS:  - Educate patient/family on patient safety including physical limitations  - Instruct patient to call for assistance with activity   - Consult OT/PT to assist with strengthening/mobility   - Keep Call bell within reach  - Keep bed low and locked with side rails adjusted as appropriate  - Keep care items and personal belongings within reach  - Initiate and maintain comfort rounds  - Apply yellow socks and bracelet for high fall risk patients  - Consider moving patient to room near nurses station  Outcome: Progressing     Problem: DISCHARGE PLANNING  Goal: Discharge to home or other facility with appropriate resources  Description: INTERVENTIONS:  - Identify barriers to discharge w/patient and caregiver  - Arrange for needed discharge resources and transportation as appropriate  - Identify discharge learning needs (meds, wound care, etc.)  - Arrange for interpretive services to assist at discharge as needed  - Refer to Case Management Department for coordinating discharge planning if the patient needs post-hospital services based on physician/advanced practitioner order or complex needs related to functional status, cognitive ability, or social support system  Outcome: Progressing     Problem: Knowledge Deficit  Goal: Patient/family/caregiver demonstrates understanding of disease process, treatment plan, medications, and discharge instructions  Description: Complete learning assessment and assess knowledge base. Interventions:  - Provide teaching at level of understanding  - Provide teaching via preferred learning methods  Outcome: Progressing     Problem: Neurological Deficit  Goal: Neurological status is stable or improving  Description: Interventions:  - Monitor and assess patient's level of consciousness, motor function, sensory function, and level of assistance needed for ADLs. - Monitor and report changes from baseline. Collaborate with interdisciplinary team to initiate plan and implement interventions as ordered. - Provide and maintain a safe environment. - Consider seizure precautions. - Consider fall precautions. - Consider aspiration precautions. - Consider bleeding precautions.   Outcome: Progressing

## 2023-09-02 NOTE — ASSESSMENT & PLAN NOTE
• POA beginning at 11 AM developed bilateral blurred vision while looking at computer while at work then at 3 PM developed nose numbness, metallic taste in mouth followed by right-sided facial heaviness and right upper extremity weakness. Right upper extremity weakness and blurred vision persist at time of admission. • Admit to Stroke Pathway  • NIH 1- No TNK per neurology  • CT/CTA brain without ischemia, infarct, bleed, lesion or LVO  • Aspirin 325 mg loaded in ED then 81 mg daily   • Atorvastatin 40 mg daily  • Lipid panel reviewed. • Telemetry monitoring did not reveal any tacky or bradycardia arrhythmias.   • -200 permissive hypertension  • Follow-up on echocardiogram with bubble study  • Follow-up on Mri brain w/o contrast   • Flp, tsh, hba1c, b12, tsh, vit D noted   • speech/PT/OT eval, case management  • Appreciate formal neurology consultation

## 2023-09-02 NOTE — PLAN OF CARE
Problem: OCCUPATIONAL THERAPY ADULT  Goal: Performs self-care activities at highest level of function for planned discharge setting. See evaluation for individualized goals. Description: Treatment Interventions: ADL retraining, Functional transfer training, UE strengthening/ROM, Endurance training, Patient/family training, Neuromuscular reeducation, Fine motor coordination activities, Energy conservation, Activityengagement          See flowsheet documentation for full assessment, interventions and recommendations. Note: Limitation: Decreased ADL status, Decreased UE strength, Decreased endurance, Decreased high-level ADLs, Decreased self-care trans  Prognosis: Good  Assessment: Pt is a 34 y.o. female, admitted to [address] [place] [address] 9/1/2023 d/t experiencing blurred vision, numbness of her nose, metallic taste in mouth, R sided favial and UE weakness and heaviness. Dx: stroke-like symptoms. Pt with PMHx impacting their performance during ADL tasks, including: concussion. Prior to admission to the hospital Pt was performing ADLs without physical assistance. IADLs without physical assistance. Functional transfers/ambulation without physical assistance. Cognitive status was PTA was WNL. OT order placed to assess Pt's ADLs, cognitive status, and performance during functional tasks in order to maximize safety and independence while making most appropriate d/c recommendations. Pt's clinical presentation is currently unstable/unpredictable given new onset deficits that effect Pt's occupational performance and ability to safely return to OF including decrease activity tolerance, decrease standing balance, decrease performance during ADL tasks, decrease UB MS, decrease generalized strength, decrease performance during functional transfers, decrease FM control and steps to enter home.  Personal factors affecting Pt at time of initial evaluation include: step(s) to enter environment, multi-level environment, inability to perform current job functions, inability to perform IADLs and inability to perform ADLs. Pt will benefit from continued skilled OT services to address deficits as defined above and to maximize level independence/participation during ADLs and functional tasks to facilitate return toward PLOF and improved quality of life. From an occupational therapy standpoint, recommendation at time of d/c would be outpatient OT if visual and/or R sided weakness symptoms do not resolve. OT Discharge Recommendation: Home with outpatient rehabilitation (Recommend outpatient OT if strength and visual deficits do not resolve by discharge.  If symptoms resolve, no rehab needs.)

## 2023-09-02 NOTE — PLAN OF CARE
Problem: PHYSICAL THERAPY ADULT  Goal: Performs mobility at highest level of function for planned discharge setting. See evaluation for individualized goals. Description: Treatment/Interventions: Functional transfer training, LE strengthening/ROM, Elevations, Therapeutic exercise, Gait training, Spoke to nursing, OT  Equipment Recommended:  (none)       See flowsheet documentation for full assessment, interventions and recommendations. 9/2/2023 1414 by Guanakito Herrera PT  Note: Prognosis: Excellent  Problem List: Decreased strength, Obesity  Assessment: Pt is a 34 y.o. female seen for PT evaluation s/p admission to [address] [place] [address] on 9/1/2023 with Stroke-like symptoms. Order placed for PT services. Upon evaluation: Pt is presenting with impaired functional mobility due to decreased strength and gait deviations requiring  S assistance for ambulation with no ad . Pt's clinical presentation is currently unstable/unpredictable given the functional mobility deficits above, especially weakness and gait deviations, coupled with fall risks as indicated by AM-PAC 6-Clicks: 02/21 as well as obesity and combined with medical complications of need for input for mobility technique/safety and new onset of R sided wekaness/deficits and blurred vision. Pt's PMHx and comorbidities that may affect physical performance and progress include: concussion. Personal factors affecting pt at time of IE include: step(s) to enter environment, multi-level environment, inability to perform current job functions and inability to perform IADLs. Pt will benefit from continued skilled PT services to address deficits as defined above and to maximize level of functional mobility to facilitate return toward PLOF and improved QOL.  From PT/mobility standpoint, recommendation at time of d/c would be OPPT pending progress in order to reduce fall risk and maximize pt's functional independence and consistency with mobility in order to facilitate return to PLOF. Recommend ther ex next 1-2 sessions. Barriers to Discharge: None  Barriers to Discharge Comments: Patient has supportive spouse  PT Discharge Recommendation: Home with outpatient rehabilitation    See flowsheet documentation for full assessment.

## 2023-09-02 NOTE — PHYSICAL THERAPY NOTE
PHYSICAL THERAPY EVALUATION  NAME:  Bertin Bernard  DATE: 09/02/23    AGE:   34 y.o. Mrn:   [id number]  ADMIT DX:  Blurred vision [H53.8]  Acute right-sided weakness [R53.1]  Blurry vision, bilateral [H53.8]    Past Medical History:   Diagnosis Date   • Concussion      Length Of Stay: 0  Performed at least 2 patient identifiers during session: Name and Birthday  PHYSICAL THERAPY EVALUATION :    09/02/23 1313   PT Last Visit   PT Visit Date 09/02/23   Note Type   Note type Evaluation   Pain Assessment   Pain Assessment Tool 0-10   Home Living   Type of Home House  (Split level home with 4 steps to main floor and 4 steps to basement)   Home Layout Two level;Bed/bath upstairs;Stairs to enter without rails  (4 BILL without HR)   Bathroom Shower/Tub Walk-in shower   Bathroom Toilet Standard   Home Equipment   (none)   Additional Comments Patient reports living in a split level home with 4 Bill w/o HR. 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 Patient drive, rides horses and works F/T as a manager.   Patient is R hand dominant   Cognition   Overall Cognitive Status WFL   Arousal/Participation Cooperative   Attention Within functional limits   Orientation Level Oriented X4   Memory Within functional limits   Following Commands Follows all commands and directions without difficulty   Subjective   Subjective "I didn't think i would be staying here when I came into the ER"  "My right arm is better, but still weak"   RLE Assessment   RLE Assessment WNL   Strength RLE   RLE Overall Strength 3+/5  (Patient demonstrates weakness at all joints R V L and graded at 3+/5. Patient strength is sufficient for safe funcitonal mobility)   LLE Assessment   LLE Assessment WNL  (4+/5)   Light Touch   RLE Light Touch Grossly intact   LLE Light Touch Grossly intact   Bed Mobility   Additional Comments Patient OOB at start of session. BM N/T D/T same   Transfers   Sit to Stand 5  Supervision   Stand to Sit 5  Supervision   Stand pivot 5  Supervision   Additional Comments Patient completed all funcitonal transfers including STS from bedside chair, toilet and SPT @ S/Ds with good safety awareness   Ambulation/Elevation   Gait pattern Decreased foot clearance   Gait Assistance 5  Supervision   Additional items Verbal cues   Assistive Device None   Distance Patient ambulated distances > 300' without Ad and S. Patient noted to have slight decreased foot clearance R, however, adequate for safe ambulation. patietn provided with VC's for increased awareness of same. Stair Management Assistance 5  Supervision   Stair Management Technique One rail R  (step over step)   Number of Stairs 13   Balance   Static Sitting Normal   Dynamic Sitting Good   Static Standing Fair +   Dynamic Standing Fair +   Activity Tolerance   Activity Tolerance Patient tolerated treatment well   Medical Staff Made Aware Spoke to OT [address] [place] [address] spoke to JOAQUIN Jernigan   Assessment   Prognosis Excellent   Problem List Decreased strength;Obesity   Barriers to Discharge None   Barriers to Discharge Comments Patient has supportive spouse   Goals   Patient Goals "i need to get home to my boys"   STG Expiration Date 09/16/23   Plan   Treatment/Interventions Functional transfer training;LE strengthening/ROM; Elevations; Therapeutic exercise;Gait training;Spoke to nursing;OT   PT Frequency 3-5x/wk   Recommendation   PT Discharge Recommendation Home with outpatient rehabilitation   Equipment Recommended   (none)   AM-PAC Basic Mobility Inpatient   Turning in Flat Bed Without Bedrails 4   Lying on Back to Sitting on Edge of Flat Bed Without Bedrails 4   Moving Bed to Chair 4   Standing Up From Chair Using Arms 4   Walk in Room 3   Climb 3-5 Stairs With Railing 3   Basic Mobility Inpatient Raw Score 22   Basic Mobility Standardized Score 47.4   Highest Level Of Mobility   JH-HLM Goal 7: Walk 25 feet or more   JH-HLM Achieved 8: Walk 250 feet ot more   End of Consult   Patient Position at End of Consult Bedside chair;Bed/Chair alarm activated     (Please find full objective findings from PT assessment regarding body systems outlined above). Assessment: Pt is a 34 y.o. female seen for PT evaluation s/p admission to [address] [place] [address] on 9/1/2023 with Stroke-like symptoms. Order placed for PT services. Upon evaluation: Pt is presenting with impaired functional mobility due to decreased strength and gait deviations requiring  S assistance for ambulation with no ad . Pt's clinical presentation is currently unstable/unpredictable given the functional mobility deficits above, especially weakness and gait deviations, coupled with fall risks as indicated by AM-PAC 6-Clicks: 32/92 as well as obesity and combined with medical complications of need for input for mobility technique/safety and new onset of R sided wekaness/deficits and blurred vision. Pt's PMHx and comorbidities that may affect physical performance and progress include: concussion. Personal factors affecting pt at time of IE include: step(s) to enter environment, multi-level environment, inability to perform current job functions and inability to perform IADLs. Pt will benefit from continued skilled PT services to address deficits as defined above and to maximize level of functional mobility to facilitate return toward PLOF and improved QOL.  From PT/mobility standpoint, recommendation at time of d/c would be OPPT pending progress in order to reduce fall risk and maximize pt's functional independence and consistency with mobility in order to facilitate return to PLOF. Recommend ther ex next 1-2 sessions. The patient's AM-PAC Basic Mobility Inpatient Short Form Raw Score is 22. A Raw score of greater than 16 suggests the patient may benefit from discharge to home with OPPT. Please also refer to the recommendation of the Physical Therapist for safe discharge planning. Goals: Pt will:. Pt will perform transfers with I to increase Indep in prior living environment and prepare for ambulation. Pt will ambulate with no AD for >/= 500 with  I  to resume PLOF and to access home environment. Pt will complete >/= 15 steps with with unilateral handrail with I to return to home with BILL and return to multilevel home. Pt will participate in objective balance assessment to determine baseline fall risk. Pt will increase B LE strength >/= 1/2 MMT grade to facilitate functional mobility. Increase R LE strength 1 grade to facilitate independent mobility.      Jagruti Call, PT,MSPT

## 2023-09-02 NOTE — H&P
[facility]  H&P  Name: Terra Alexander 34 y.o. female I MRN: [id number]  Unit/Bed#: -01 I Date of Admission: 9/1/2023   Date of Service: 9/1/2023 I Hospital Day: 0      Assessment/Plan   * Stroke-like symptoms  Assessment & Plan  • POA beginning at 11 AM developed bilateral blurred vision while looking at computer while at work then at 3 PM developed nose numbness, metallic taste in mouth followed by right-sided facial heaviness and right upper extremity weakness. Right upper extremity weakness and blurred vision persist at time of admission. • Admit to Stroke Pathway  • NIH 1- No TNK per neurology  • CT/CTA brain without ischemia, infarct, bleed, lesion or LVO  • Aspirin 325 mg loaded in ED then 81 mg daily   • Atorvastatin 40 mg daily  • Telemetry monitoring  • -200 permissive hypertension  • Echocardiogram with bubble study  • Mri brain w/o contrast when able  • Flp, tsh, hba1c, b12, tsh, vit D pending  • Speech/PT/OT eval, case management  • Appreciate formal neurology consultation         VTE Pharmacologic Prophylaxis:   High Risk (Score >/= 5) - Pharmacological DVT Prophylaxis Ordered: enoxaparin (Lovenox). Sequential Compression Devices Ordered. Code Status: Level 1 - Full Code   Discussion with family: Updated  (sister) at bedside. Anticipated Length of Stay: Patient will be admitted on an observation basis with an anticipated length of stay of less than 2 midnights secondary to stroke like symptoms. Total Time Spent on Date of Encounter in care of patient: 65 minutes This time was spent on one or more of the following: performing physical exam; counseling and coordination of care; obtaining or reviewing history; documenting in the medical record; reviewing/ordering tests, medications or procedures; communicating with other healthcare professionals and discussing with patient's family/caregivers.     Chief Complaint: Blurred vision, right arm weakness    History of Present Illness:  Donna Hargrove is a 34 y.o. female with a PMH of concussion as a teenager requiring phototherapy after soccer injury, takes no medications, denies illicit substance use who presents with blurry vision and right arm numbness. Patient was at work on the computer at 6 AM when she developed blurred vision.  took her home from work and she then developed numbness of her nose followed by a metallic taste in her mouth then right-sided facial heaviness and right upper extremity weakness around 3 pm. Denies any other paresthesias or weakness. Denies coordination or gait issues. Presented to the emergency department was initiated as a stroke alert, CT brain no acute abnormality and was recommended for admission under stroke pathway. Patient is adopted and does not know her family history. Previous history of blurry vision only during severe concussion symptoms as a teenager. Review of Systems:  Review of Systems   Constitutional: Negative for chills and fever. HENT: Negative for ear pain and sore throat. Eyes: Positive for visual disturbance. Negative for pain. Respiratory: Negative for cough and shortness of breath. Cardiovascular: Negative for chest pain and palpitations. Gastrointestinal: Negative for abdominal pain and vomiting. Genitourinary: Negative for dysuria and hematuria. Musculoskeletal: Negative for arthralgias and back pain. Skin: Negative for color change and rash. Neurological: Positive for dizziness, weakness and numbness. Negative for seizures and syncope. All other systems reviewed and are negative. Past Medical and Surgical History:   Past Medical History:   Diagnosis Date   • Concussion        Past Surgical History:   Procedure Laterality Date   • KNEE ARTHROSCOPY         Meds/Allergies:  Denies taking any prescription or OTC medications. I have reviewed home medications with patient personally.     Allergies: Allergies   Allergen Reactions   • Latex Swelling and Rash     REDNESS, FINGERS SWELL, RASH       Social History:  Marital Status: /Civil Union   Occupation: office  Patient Pre-hospital Living Situation: With spouse  Patient Pre-hospital Level of Mobility: walks  Patient Pre-hospital Diet Restrictions: none  Substance Use History:   Social History     Substance and Sexual Activity   Alcohol Use Not Currently     Social History     Tobacco Use   Smoking Status Never   Smokeless Tobacco Never     Social History     Substance and Sexual Activity   Drug Use Never       Family History:  Family History   Adopted: Yes       Physical Exam:     Vitals:   Blood Pressure: 124/74 (09/01/23 2042)  Pulse: 79 (09/01/23 2042)  Temperature: 98.1 °F (36.7 °C) (09/01/23 2042)  Temp Source: Temporal (09/01/23 1809)  Respirations: 16 (09/01/23 2042)  SpO2: 95 % (09/01/23 2042)    Physical Exam  Vitals and nursing note reviewed. Constitutional:       General: She is not in acute distress. Appearance: She is well-developed. She is obese. HENT:      Head: Normocephalic and atraumatic. Mouth/Throat:      Mouth: Mucous membranes are dry. Eyes:      Extraocular Movements: Extraocular movements intact. Conjunctiva/sclera: Conjunctivae normal.      Pupils: Pupils are equal, round, and reactive to light. Comments: Dizziness elicited with EOMs  Pupils are dilated 5+ bilaterally but equally reactive  Vision continues to be blurred bilaterally   Cardiovascular:      Rate and Rhythm: Normal rate and regular rhythm. Heart sounds: Murmur heard. Pulmonary:      Effort: Pulmonary effort is normal. No respiratory distress. Breath sounds: Normal breath sounds. Abdominal:      Palpations: Abdomen is soft. Tenderness: There is no abdominal tenderness. Musculoskeletal:         General: No swelling. Cervical back: Neck supple. Skin:     General: Skin is warm and dry.       Capillary Refill: Capillary refill takes less than 2 seconds. Neurological:      Mental Status: She is alert and oriented to person, place, and time. Sensory: No sensory deficit. Motor: Weakness present. Comments: Right upper extremity 4/5 and drift   Psychiatric:         Mood and Affect: Mood normal.       Additional Data:     Lab Results:  Results from last 7 days   Lab Units 09/01/23  1821   WBC Thousand/uL 9.01   HEMOGLOBIN g/dL 15.1   HEMATOCRIT % 47.7*   PLATELETS Thousands/uL 234     Results from last 7 days   Lab Units 09/01/23  1821   SODIUM mmol/L 137   POTASSIUM mmol/L 3.7   CHLORIDE mmol/L 103   CO2 mmol/L 27   BUN mg/dL 8   CREATININE mg/dL 0.63   ANION GAP mmol/L 7   CALCIUM mg/dL 9.6   GLUCOSE RANDOM mg/dL 96     Results from last 7 days   Lab Units 09/01/23  1821   INR  0.88     Results from last 7 days   Lab Units 09/01/23  1822   POC GLUCOSE mg/dl 107               Lines/Drains:  Invasive Devices     Peripheral Intravenous Line  Duration           Peripheral IV 09/01/23 Left Antecubital <1 day                    Imaging: Reviewed radiology reports from this admission including: CT head  CTA stroke alert (head/neck)   Final Result by John Stovall DO (09/01 1834)      No carotid or vertebral artery stenosis         No focal intracranial stenosis or aneurysm. Findings were directly communicated with Danial Ospina via [facility] text at 6:32 p.m. on 9/1/2023. Workstation performed: [id number]         CT stroke alert brain   Final Result by John Stovall DO (09/01 1817)      No acute intracranial abnormality. Findings were directly discussed with Danial Ospina at 6:15 p.m. on 9/1/2023. Workstation performed: [id number]         MRI Inpatient Order    (Results Pending)       EKG and Other Studies Reviewed on Admission:   · EKG: NSR. HR no ectopy. ** Please Note: This note has been constructed using a voice recognition system.  **

## 2023-09-02 NOTE — PLAN OF CARE
Problem: PAIN - ADULT  Goal: Verbalizes/displays adequate comfort level or baseline comfort level  Description: Interventions:  - Encourage patient to monitor pain and request assistance  - Assess pain using appropriate pain scale  - Administer analgesics based on type and severity of pain and evaluate response  - Implement non-pharmacological measures as appropriate and evaluate response  - Consider cultural and social influences on pain and pain management  - Notify physician/advanced practitioner if interventions unsuccessful or patient reports new pain  Outcome: Progressing     Problem: Knowledge Deficit  Goal: Patient/family/caregiver demonstrates understanding of disease process, treatment plan, medications, and discharge instructions  Description: Complete learning assessment and assess knowledge base. Interventions:  - Provide teaching at level of understanding  - Provide teaching via preferred learning methods  Outcome: Progressing     Problem: Neurological Deficit  Goal: Neurological status is stable or improving  Description: Interventions:  - Monitor and assess patient's level of consciousness, motor function, sensory function, and level of assistance needed for ADLs. - Monitor and report changes from baseline. Collaborate with interdisciplinary team to initiate plan and implement interventions as ordered. - Provide and maintain a safe environment. - Consider seizure precautions. - Consider fall precautions. - Consider aspiration precautions. - Consider bleeding precautions.   Outcome: Progressing

## 2023-09-03 ENCOUNTER — APPOINTMENT (OUTPATIENT)
Dept: MRI IMAGING | Facility: HOSPITAL | Age: 33
DRG: 125 | End: 2023-09-03
Payer: COMMERCIAL

## 2023-09-03 ENCOUNTER — HOSPITAL ENCOUNTER (INPATIENT)
Facility: HOSPITAL | Age: 33
LOS: 2 days | Discharge: HOME | End: 2023-09-05
Attending: INTERNAL MEDICINE | Admitting: INTERNAL MEDICINE
Payer: COMMERCIAL

## 2023-09-03 VITALS
BODY MASS INDEX: 37.93 KG/M2 | HEIGHT: 66 IN | SYSTOLIC BLOOD PRESSURE: 124 MMHG | TEMPERATURE: 97.5 F | HEART RATE: 98 BPM | RESPIRATION RATE: 16 BRPM | OXYGEN SATURATION: 97 % | WEIGHT: 236 LBS | DIASTOLIC BLOOD PRESSURE: 76 MMHG

## 2023-09-03 DIAGNOSIS — H53.133 ACUTE LOSS OF VISION, BILATERAL: Primary | ICD-10-CM

## 2023-09-03 DIAGNOSIS — R53.1 ACUTE RIGHT-SIDED WEAKNESS: ICD-10-CM

## 2023-09-03 LAB
CK SERPL-CCNC: 129 U/L (ref 26–192)
CRP SERPL QL: 3.8 MG/L
ERYTHROCYTE [SEDIMENTATION RATE] IN BLOOD: 16 MM/HOUR (ref 0–19)

## 2023-09-03 PROCEDURE — 70543 MRI ORBT/FAC/NCK W/O &W/DYE: CPT

## 2023-09-03 PROCEDURE — A9585 GADOBUTROL INJECTION: HCPCS | Performed by: NURSE PRACTITIONER

## 2023-09-03 PROCEDURE — 86038 ANTINUCLEAR ANTIBODIES: CPT | Performed by: PSYCHIATRY & NEUROLOGY

## 2023-09-03 PROCEDURE — 86225 DNA ANTIBODY NATIVE: CPT | Performed by: PSYCHIATRY & NEUROLOGY

## 2023-09-03 PROCEDURE — 84207 ASSAY OF VITAMIN B-6: CPT | Performed by: PSYCHIATRY & NEUROLOGY

## 2023-09-03 PROCEDURE — 86140 C-REACTIVE PROTEIN: CPT | Performed by: PSYCHIATRY & NEUROLOGY

## 2023-09-03 PROCEDURE — 86618 LYME DISEASE ANTIBODY: CPT | Performed by: PSYCHIATRY & NEUROLOGY

## 2023-09-03 PROCEDURE — 82550 ASSAY OF CK (CPK): CPT | Performed by: PSYCHIATRY & NEUROLOGY

## 2023-09-03 PROCEDURE — 82300 ASSAY OF CADMIUM: CPT | Performed by: INTERNAL MEDICINE

## 2023-09-03 PROCEDURE — 84165 PROTEIN E-PHORESIS SERUM: CPT | Performed by: PSYCHIATRY & NEUROLOGY

## 2023-09-03 PROCEDURE — 99223 1ST HOSP IP/OBS HIGH 75: CPT | Performed by: INTERNAL MEDICINE

## 2023-09-03 PROCEDURE — 99232 SBSQ HOSP IP/OBS MODERATE 35: CPT | Performed by: INTERNAL MEDICINE

## 2023-09-03 PROCEDURE — 85652 RBC SED RATE AUTOMATED: CPT | Performed by: PSYCHIATRY & NEUROLOGY

## 2023-09-03 PROCEDURE — 83655 ASSAY OF LEAD: CPT | Performed by: INTERNAL MEDICINE

## 2023-09-03 PROCEDURE — 82175 ASSAY OF ARSENIC: CPT | Performed by: INTERNAL MEDICINE

## 2023-09-03 PROCEDURE — 83520 IMMUNOASSAY QUANT NOS NONAB: CPT | Performed by: PSYCHIATRY & NEUROLOGY

## 2023-09-03 PROCEDURE — 86780 TREPONEMA PALLIDUM: CPT | Performed by: PSYCHIATRY & NEUROLOGY

## 2023-09-03 PROCEDURE — 84425 ASSAY OF VITAMIN B-1: CPT | Performed by: PSYCHIATRY & NEUROLOGY

## 2023-09-03 PROCEDURE — 99239 HOSP IP/OBS DSCHRG MGMT >30: CPT | Performed by: INTERNAL MEDICINE

## 2023-09-03 PROCEDURE — G0427 INPT/ED TELECONSULT70: HCPCS | Performed by: PSYCHIATRY & NEUROLOGY

## 2023-09-03 PROCEDURE — 86037 ANCA TITER EACH ANTIBODY: CPT | Performed by: PSYCHIATRY & NEUROLOGY

## 2023-09-03 PROCEDURE — 83090 ASSAY OF HOMOCYSTEINE: CPT | Performed by: PSYCHIATRY & NEUROLOGY

## 2023-09-03 PROCEDURE — 86235 NUCLEAR ANTIGEN ANTIBODY: CPT | Performed by: PSYCHIATRY & NEUROLOGY

## 2023-09-03 PROCEDURE — 86430 RHEUMATOID FACTOR TEST QUAL: CPT | Performed by: PSYCHIATRY & NEUROLOGY

## 2023-09-03 PROCEDURE — 83825 ASSAY OF MERCURY: CPT | Performed by: INTERNAL MEDICINE

## 2023-09-03 PROCEDURE — 70553 MRI BRAIN STEM W/O & W/DYE: CPT

## 2023-09-03 PROCEDURE — 83918 ORGANIC ACIDS TOTAL QUANT: CPT | Performed by: PSYCHIATRY & NEUROLOGY

## 2023-09-03 RX ORDER — ERGOCALCIFEROL 1.25 MG/1
50000 CAPSULE ORAL WEEKLY
Status: DISCONTINUED | OUTPATIENT
Start: 2023-09-03 | End: 2023-09-03 | Stop reason: HOSPADM

## 2023-09-03 RX ORDER — ERGOCALCIFEROL 1.25 MG/1
50000 CAPSULE ORAL WEEKLY
Status: DISCONTINUED | OUTPATIENT
Start: 2023-09-10 | End: 2023-09-05 | Stop reason: HOSPADM

## 2023-09-03 RX ORDER — ENOXAPARIN SODIUM 100 MG/ML
40 INJECTION SUBCUTANEOUS DAILY
Status: CANCELLED | OUTPATIENT
Start: 2023-09-04

## 2023-09-03 RX ORDER — ERGOCALCIFEROL 1.25 MG/1
50000 CAPSULE ORAL WEEKLY
Status: CANCELLED | OUTPATIENT
Start: 2023-09-10

## 2023-09-03 RX ORDER — LORAZEPAM 2 MG/ML
1 INJECTION INTRAMUSCULAR ONCE
Status: COMPLETED | OUTPATIENT
Start: 2023-09-03 | End: 2023-09-03

## 2023-09-03 RX ORDER — ACETAMINOPHEN 325 MG/1
650 TABLET ORAL EVERY 6 HOURS PRN
Status: DISCONTINUED | OUTPATIENT
Start: 2023-09-03 | End: 2023-09-05 | Stop reason: HOSPADM

## 2023-09-03 RX ORDER — GADOBUTROL 604.72 MG/ML
10 INJECTION INTRAVENOUS
Status: COMPLETED | OUTPATIENT
Start: 2023-09-03 | End: 2023-09-03

## 2023-09-03 RX ORDER — ACETAMINOPHEN 325 MG/1
650 TABLET ORAL EVERY 6 HOURS PRN
Status: CANCELLED | OUTPATIENT
Start: 2023-09-03

## 2023-09-03 RX ORDER — ENOXAPARIN SODIUM 100 MG/ML
40 INJECTION SUBCUTANEOUS DAILY
Status: DISCONTINUED | OUTPATIENT
Start: 2023-09-04 | End: 2023-09-05 | Stop reason: HOSPADM

## 2023-09-03 RX ADMIN — ERGOCALCIFEROL 50000 UNITS: 1.25 CAPSULE ORAL at 09:38

## 2023-09-03 RX ADMIN — ENOXAPARIN SODIUM 40 MG: 40 INJECTION SUBCUTANEOUS at 09:36

## 2023-09-03 RX ADMIN — GADOBUTROL 10 ML: 604.72 INJECTION INTRAVENOUS at 10:48

## 2023-09-03 RX ADMIN — LORAZEPAM 1 MG: 2 INJECTION INTRAMUSCULAR; INTRAVENOUS at 10:24

## 2023-09-03 RX ADMIN — ATORVASTATIN CALCIUM 40 MG: 40 TABLET, FILM COATED ORAL at 17:44

## 2023-09-03 RX ADMIN — ASPIRIN 81 MG 81 MG: 81 TABLET ORAL at 09:37

## 2023-09-03 NOTE — PLAN OF CARE
Problem: PAIN - ADULT  Goal: Verbalizes/displays adequate comfort level or baseline comfort level  Description: Interventions:  - Encourage patient to monitor pain and request assistance  - Assess pain using appropriate pain scale  - Administer analgesics based on type and severity of pain and evaluate response  - Implement non-pharmacological measures as appropriate and evaluate response  - Consider cultural and social influences on pain and pain management  - Notify physician/advanced practitioner if interventions unsuccessful or patient reports new pain  Outcome: Progressing     Problem: INFECTION - ADULT  Goal: Absence or prevention of progression during hospitalization  Description: INTERVENTIONS:  - Assess and monitor for signs and symptoms of infection  - Monitor lab/diagnostic results  - Monitor all insertion sites, i.e. indwelling lines, tubes, and drains  - Monitor endotracheal if appropriate and nasal secretions for changes in amount and color  - [place] appropriate cooling/warming therapies per order  - Administer medications as ordered  - Instruct and encourage patient and family to use good hand hygiene technique  - Identify and instruct in appropriate isolation precautions for identified infection/condition  Outcome: Progressing  Goal: Absence of fever/infection during neutropenic period  Description: INTERVENTIONS:  - Monitor WBC    Outcome: Progressing     Problem: SAFETY ADULT  Goal: Patient will remain free of falls  Description: INTERVENTIONS:  - Educate patient/family on patient safety including physical limitations  - Instruct patient to call for assistance with activity   - Consult OT/PT to assist with strengthening/mobility   - Keep Call bell within reach  - Keep bed low and locked with side rails adjusted as appropriate  - Keep care items and personal belongings within reach  - Initiate and maintain comfort rounds  - Apply yellow socks and bracelet for high fall risk patients  - Consider moving patient to room near nurses station  Outcome: Progressing     Problem: DISCHARGE PLANNING  Goal: Discharge to home or other facility with appropriate resources  Description: INTERVENTIONS:  - Identify barriers to discharge w/patient and caregiver  - Arrange for needed discharge resources and transportation as appropriate  - Identify discharge learning needs (meds, wound care, etc.)  - Arrange for interpretive services to assist at discharge as needed  - Refer to Case Management Department for coordinating discharge planning if the patient needs post-hospital services based on physician/advanced practitioner order or complex needs related to functional status, cognitive ability, or social support system  Outcome: Progressing     Problem: Knowledge Deficit  Goal: Patient/family/caregiver demonstrates understanding of disease process, treatment plan, medications, and discharge instructions  Description: Complete learning assessment and assess knowledge base. Interventions:  - Provide teaching at level of understanding  - Provide teaching via preferred learning methods  Outcome: Progressing     Problem: Neurological Deficit  Goal: Neurological status is stable or improving  Description: Interventions:  - Monitor and assess patient's level of consciousness, motor function, sensory function, and level of assistance needed for ADLs. - Monitor and report changes from baseline. Collaborate with interdisciplinary team to initiate plan and implement interventions as ordered. - Provide and maintain a safe environment. - Consider seizure precautions. - Consider fall precautions. - Consider aspiration precautions. - Consider bleeding precautions.   Outcome: Progressing

## 2023-09-03 NOTE — PROGRESS NOTES
[address] [facility]  Progress Note  Name: Renetta Mark I  MRN: [id number]  Unit/Bed#: -01 I Date of Admission: 9/1/2023   Date of Service: 9/3/2023 I Hospital Day: 0    Assessment/Plan   * Stroke-like symptoms  Assessment & Plan  • POA beginning at 11 AM developed bilateral blurred vision while looking at computer while at work then at 3 PM developed nose numbness, metallic taste in mouth followed by right-sided facial heaviness and right upper extremity weakness. Right upper extremity weakness and blurred vision persist at time of admission. • Patient was admitted to [facility]  • NIH 1- No TNK per neurology  • CT/CTA brain without ischemia, infarct, bleed, lesion or LVO  • Aspirin 325 mg loaded in ED then 81 mg daily   • Atorvastatin 40 mg daily  • Lipid panel reviewed. • Telemetry monitoring did not reveal any tacky or bradycardia arrhythmias. • -200 permissive hypertension  • Follow-up on echocardiogram with bubble study  • Follow-up on Mri brain w/o contrast.  To be done today. • Flp, tsh, hba1c, b12, tsh, vit D noted. Vitamin D supplementation started. • speech/PT/OT eval, case management  • Appreciate formal neurology consultation  • 9/3 patient continues to have right upper extremity weakness. Unchanged from yesterday. Reports improvement in blurring of vision. Also has right lower extremity plantar and dorsiflexion weakness                 VTE Pharmacologic Prophylaxis:   High Risk (Score >/= 5) - Pharmacological DVT Prophylaxis Ordered: enoxaparin (Lovenox). Sequential Compression Devices Ordered. Patient Centered Rounds: I performed bedside rounds with nursing staff today.   Discussions with Specialists or Other Care Team Provider: Discussed with nursing    Education and Discussions with Family / Patient:   Total Time Spent on Date of Encounter in care of patient: 35 minutes This time was spent on one or more of the following: performing physical exam; counseling and coordination of care; obtaining or reviewing history; documenting in the medical record; reviewing/ordering tests, medications or procedures; communicating with other healthcare professionals and discussing with patient's family/caregivers. Current Length of Stay: 0 day(s)  Current Patient Status: Observation   Certification Statement: The patient will continue to require additional inpatient hospital stay due to Ongoing stroke work-up  Discharge Plan: Anticipate discharge in 24-48 hrs to home. Code Status: Level 1 - Full Code    Subjective:   Seen and examined at bedside. Denies any headache or worsening of vision. Reports some improvement in blurring of vision. Denies any diplopia. Denies any neck pain. Continues to have right upper extremity weakness with decreased . Also reports some difficulty with plantar and dorsiflexion of right lower extremity. Denies any speech or swallowing difficulty. Denies any chest pain or palpitations. Objective:     Vitals:   Temp (24hrs), Av.6 °F (36.4 °C), Min:97.3 °F (36.3 °C), Max:97.9 °F (36.6 °C)    Temp:  [97.3 °F (36.3 °C)-97.9 °F (36.6 °C)] 97.7 °F (36.5 °C)  HR:  [63-86] 84  Resp:  [15-18] 18  BP: (100-128)/(59-82) 106/63  SpO2:  [95 %-97 %] 97 %  Body mass index is 38.09 kg/m². Input and Output Summary (last 24 hours): Intake/Output Summary (Last 24 hours) at 9/3/2023 1012  Last data filed at 9/3/2023 0925  Gross per 24 hour   Intake 840 ml   Output 300 ml   Net 540 ml       Physical Exam:   Physical Exam  Constitutional:       General: She is not in acute distress. Appearance: She is obese. HENT:      Head: Normocephalic. Nose: Nose normal.      Mouth/Throat:      Mouth: Mucous membranes are moist.   Eyes:      Extraocular Movements: Extraocular movements intact. Pupils: Pupils are equal, round, and reactive to light. Cardiovascular:      Rate and Rhythm: Normal rate and regular rhythm.       Pulses: Normal pulses. Pulmonary:      Effort: Pulmonary effort is normal.   Abdominal:      General: Abdomen is flat. Bowel sounds are normal.      Palpations: Abdomen is soft. Musculoskeletal:      Cervical back: Neck supple. Right lower leg: No edema. Left lower leg: No edema. Skin:     General: Skin is warm. Neurological:      Mental Status: She is alert and oriented to person, place, and time. Cranial Nerves: No cranial nerve deficit. Sensory: No sensory deficit. Motor: Weakness present. Coordination: Coordination normal.      Comments: Diminished strength right upper extremity. Diminished dorsi and plantarflexion right lower extremity.           Additional Data:     Labs:  Results from last 7 days   Lab Units 09/02/23  0457   WBC Thousand/uL 6.49   HEMOGLOBIN g/dL 12.5   HEMATOCRIT % 40.0   PLATELETS Thousands/uL 193   NEUTROS PCT % 65   LYMPHS PCT % 27   MONOS PCT % 5   EOS PCT % 3     Results from last 7 days   Lab Units 09/02/23  0457   SODIUM mmol/L 138   POTASSIUM mmol/L 3.8   CHLORIDE mmol/L 107   CO2 mmol/L 26   BUN mg/dL 9   CREATININE mg/dL 0.67   ANION GAP mmol/L 5   CALCIUM mg/dL 8.6   ALBUMIN g/dL 3.7   TOTAL BILIRUBIN mg/dL 0.53   ALK PHOS U/L 67   ALT U/L 25   AST U/L 26   GLUCOSE RANDOM mg/dL 95     Results from last 7 days   Lab Units 09/01/23  1821   INR  0.88     Results from last 7 days   Lab Units 09/01/23  1822   POC GLUCOSE mg/dl 107               Lines/Drains:  Invasive Devices     Peripheral Intravenous Line  Duration           Peripheral IV 09/01/23 Left Antecubital 1 day                      Imaging: Reviewed radiology reports from this admission including: CT head    Recent Cultures (last 7 days):         Last 24 Hours Medication List:   Current Facility-Administered Medications   Medication Dose Route Frequency Provider Last Rate   • acetaminophen  650 mg Oral Q6H PRN Krissy S Geovanny, CRNP     • aspirin  81 mg Oral Daily Krissy S Geovanny, CRNP     • atorvastatin 40 mg Oral QPM SOPHIA Hawkins     • enoxaparin  40 mg Subcutaneous Daily SOPHIA Hawkins     • ergocalciferol  50,000 Units Oral Weekly Wilner Fung MD          Today, Patient Was Seen By: Wilner Fung MD    **Please Note: This note may have been constructed using a voice recognition system. **

## 2023-09-03 NOTE — PLAN OF CARE
Problem: PAIN - ADULT  Goal: Verbalizes/displays adequate comfort level or baseline comfort level  Description: Interventions:  - Encourage patient to monitor pain and request assistance  - Assess pain using appropriate pain scale  - Administer analgesics based on type and severity of pain and evaluate response  - Implement non-pharmacological measures as appropriate and evaluate response  - Consider cultural and social influences on pain and pain management  - Notify physician/advanced practitioner if interventions unsuccessful or patient reports new pain  Outcome: Progressing     Problem: INFECTION - ADULT  Goal: Absence or prevention of progression during hospitalization  Description: INTERVENTIONS:  - Assess and monitor for signs and symptoms of infection  - Monitor lab/diagnostic results  - Monitor all insertion sites, i.e. indwelling lines, tubes, and drains  - Monitor endotracheal if appropriate and nasal secretions for changes in amount and color  - [place] appropriate cooling/warming therapies per order  - Administer medications as ordered  - Instruct and encourage patient and family to use good hand hygiene technique  - Identify and instruct in appropriate isolation precautions for identified infection/condition  Outcome: Progressing  Goal: Absence of fever/infection during neutropenic period  Description: INTERVENTIONS:  - Monitor WBC    Outcome: Progressing     Problem: SAFETY ADULT  Goal: Patient will remain free of falls  Description: INTERVENTIONS:  - Educate patient/family on patient safety including physical limitations  - Instruct patient to call for assistance with activity   - Consult OT/PT to assist with strengthening/mobility   - Keep Call bell within reach  - Keep bed low and locked with side rails adjusted as appropriate  - Keep care items and personal belongings within reach  - Initiate and maintain comfort rounds  - Make Fall Risk Sign visible to staff  - Offer Toileting every  Hours, in advance of need  - Initiate/Maintain alarm  - Obtain necessary fall risk management equipment:   - Apply yellow socks and bracelet for high fall risk patients  - Consider moving patient to room near nurses station  Outcome: Progressing  Goal: Maintain or return to baseline ADL function  Description: INTERVENTIONS:  -  Assess patient's ability to carry out ADLs; assess patient's baseline for ADL function and identify physical deficits which impact ability to perform ADLs (bathing, care of mouth/teeth, toileting, grooming, dressing, etc.)  - Assess/evaluate cause of self-care deficits   - Assess range of motion  - Assess patient's mobility; develop plan if impaired  - Assess patient's need for assistive devices and provide as appropriate  - Encourage maximum independence but intervene and supervise when necessary  - Involve family in performance of ADLs  - Assess for home care needs following discharge   - Consider OT consult to assist with ADL evaluation and planning for discharge  - Provide patient education as appropriate  Outcome: Progressing  Goal: Maintains/Returns to pre admission functional level  Description: INTERVENTIONS:  - Perform BMAT or MOVE assessment daily.   - Set and communicate daily mobility goal to care team and patient/family/caregiver. - Collaborate with rehabilitation services on mobility goals if consulted  - Perform Range of Motion  times a day. - Reposition patient every hours.   - Dangle patient  times a day  - Stand patient  times a day  - Ambulate patient times a day  - Out of bed to kavya times a day   - Out of bed for meals  times a day  - Out of bed for toileting  - Record patient progress and toleration of activity level   Outcome: Progressing

## 2023-09-03 NOTE — ASSESSMENT & PLAN NOTE
• POA beginning at 11 AM developed bilateral blurred vision while looking at computer while at work then at 3 PM developed nose numbness, metallic taste in mouth followed by right-sided facial heaviness and right upper extremity weakness. Right upper extremity weakness and blurred vision persist at time of admission. • Patient was admitted to [facility]  • NIH 1- No TNK per neurology  • CT/CTA brain without ischemia, infarct, bleed, lesion or LVO  • Aspirin 325 mg loaded in ED then 81 mg daily   • Atorvastatin 40 mg daily  • Lipid panel reviewed. • Telemetry monitoring did not reveal any tacky or bradycardia arrhythmias. • -200 permissive hypertension  • Follow-up on echocardiogram with bubble study  • Follow-up on Mri brain w/o contrast.  To be done today. • Flp, tsh, hba1c, b12, tsh, vit D noted. Vitamin D supplementation started. • speech/PT/OT eval, case management  • Appreciate formal neurology consultation  • 9/3 patient continues to have right upper extremity weakness. Unchanged from yesterday. Reports improvement in blurring of vision.   Also has right lower extremity plantar and dorsiflexion weakness

## 2023-09-03 NOTE — UTILIZATION REVIEW
Initial Clinical Review  Observation on 09/01 @ 2005 upgraded to Inpatient status. Admission: Date/Time/Statement:   Admission Orders (From admission, onward)     Ordered        09/03/23 1428  Inpatient Admission  Once            09/01/23 2005  Place in Observation  Once                      Orders Placed This Encounter   Procedures   • Inpatient Admission     Standing Status:   Standing     Number of Occurrences:   1     Order Specific Question:   Level of Care     Answer:   Med Surg [16]     Order Specific Question:   Estimated length of stay     Answer:   More than 2 Midnights     Order Specific Question:   Certification     Answer:   I certify that inpatient services are medically necessary for this patient for a duration of greater than two midnights. See H&P and MD Progress Notes for additional information about the patient's course of treatment. ED Arrival Information     Expected   -    Arrival   9/1/2023 17:58    Acuity   Emergent            Means of arrival   Walk-In    Escorted by   Family Member    Service   Hospitalist    Admission type   Emergency            Arrival complaint   blurred vision  arm weakness             Chief Complaint   Patient presents with   • STROKE Alert     1100 started with blurred vision and weakness around 1500hr. Stroke alert called at 1808       Initial Presentation: 34 y.o. female w/ a PMH of concussion as a teenager d/t soccer injury presented to the ED from home w/ blurry vision and R arm numbness. Patient was at work on the computer at 6 AM when she developed blurred vision. He  took her home and developed numbness of her nose followed by a metallic taste in her mouth then right-sided facial heaviness and RUE weakness around 3 pm.   In the ED, CT brain no acute abnormality. On exam, pupils dilated 5+ b/l, equally reactive, heart murmur heard, RUE weakness 4/5 and drift. NIH 1- No TNK per neurology.     Admit as observation level of care for stroke like symptoms. Plan: Neurology consult. Aspirin 325 mg loaded in ED then 81 mg daily. Atorvastatin 40 mg daily. Telemetry monitoring. -200 permissive hypertension. Echocardiogram with bubble study. Mri brain. Flp, tsh, hba1c, b12, tsh, vit D pending. Speech/PT/OT eval.    09/02 Progress Notes: Pt reports improvement in vision. Still w/ some RUE weakness, improving. Telemetry monitoring did not reveal any tacky or bradycardia arrhythmias. Cont ASA, statin. F/u echo, MRI brain. PT/OT recommending OP rehab. PE: abnormal gait, RUE weakness present. 09/03 Obs to IP  Progress Notes: Pt reports improvement in blurry vision. Still w/ RUE weakness w/ decreased . Also reports some difficulty with plantar and dorsiflexion of RLE. MRI brain today. Vit D supplementation started. Cont ASA, statin. Echo pending.      ED Triage Vitals [09/01/23 1809]   Temperature Pulse Respirations Blood Pressure SpO2   97.7 °F (36.5 °C) 102 18 141/97 98 %      Temp Source Heart Rate Source Patient Position - Orthostatic VS BP Location FiO2 (%)   Temporal Monitor Sitting Left arm --      Pain Score       No Pain          Wt Readings from Last 1 Encounters:   09/01/23 107 kg (236 lb)     Additional Vital Signs:     Date/Time Temp Pulse Resp BP MAP (mmHg) SpO2 O2 Device Patient Position - Orthostatic VS   09/03/23 1730 -- 98 18 119/75 -- -- -- Sitting   09/03/23 1725 -- -- 18 119/75 -- -- -- Lying   09/03/23 1325 -- 95 18 101/63 76 -- -- Sitting   09/03/23 0925 97.7 °F (36.5 °C) 84 18 106/63 77 97 % None (Room air) Sitting   09/03/23 09:24:16 97.7 °F (36.5 °C) 82 -- 106/63 77 97 % -- --   09/03/23 05:55:18 97.9 °F (36.6 °C) 73 16 104/61 75 96 % None (Room air) Lying   09/03/23 0155 97.5 °F (36.4 °C) 63 15 100/59 73 95 % -- Lying   09/02/23 21:55:28 97.3 °F (36.3 °C) Abnormal  74 16 128/82 97 95 % None (Room air) Sitting   09/02/23 1800 -- 77 18 103/62 -- 97 % None (Room air) Lying   09/02/23 1400 -- 86 18 120/71 -- 96 % None (Room air) Sitting   09/02/23 1000 -- 79 -- 112/67 -- 98 % None (Room air) Sitting   09/02/23 07:34:07 97.7 °F (36.5 °C) 74 18 112/66 81 95 % None (Room air) Lying   09/02/23 0542 97.9 °F (36.6 °C) 66 16 112/65 81 96 % -- Lying   09/02/23 03:42:10 97.7 °F (36.5 °C) 71 15 110/60 77 95 % -- --   09/02/23 0142 97.8 °F (36.6 °C) 73 16 116/69 85 95 % None (Room air) Lying   09/01/23 2342 98.1 °F (36.7 °C) 71 14 116/71 86 93 % -- Lying   09/01/23 2242 98.1 °F (36.7 °C) 85 15 114/71 85 96 % -- Lying   09/01/23 2142 97.8 °F (36.6 °C) 80 16 124/78 93 94 % None (Room air) Sitting   09/01/23 20:42:57 98.1 °F (36.7 °C) 79 16 124/74 91 95 % None (Room air) --   09/01/23 2000 -- 76 17 118/74 -- 97 % None (Room air) --   09/01/23 1930 -- 77 16 122/72 -- 99 % None (Room air) --   09/01/23 1915 -- 74 17 126/70 92 98 % None (Room air) --   09/01/23 1900 -- 76 20 120/75 -- 98 % None (Room air) --   09/01/23 1845 -- 80 20 131/74 -- 100 % None (Room air) --       Pertinent Labs/Diagnostic Test Results:   MRI brain and orbits wo and w contrast   Final Result by Harish Davila DO (09/03 1123)      Tiny focus of abnormal signal within the right paramedian frank which is faintly hyperintense on T2/FLAIR imaging, demonstrates subtle susceptibility on Mangum imaging and punctate enhancement after administration of contrast on multiple sequences. See    series 7 image 28 and series 14 image 62. Findings are nonspecific for a patient of this age and differential considerations would include a small slow flow vascular abnormality such as cavernous angioma or hereditary telangiectasia. A small subacute    infarct is considered less likely given the lack of diffusion abnormality. More aggressive etiologies such as parenchymal metastasis also considered less likely given patient's age. Recommend short-term follow-up examination in approximately 1-2 months    to assess change over time.       This examination was marked "immediate notification" in Epic in order to begin the standard process by which the radiology reading room liaison alerts the referring practitioner. Workstation performed: [id number]         CTA stroke alert (head/neck)   Final Result by Margaux Blanco DO (09/01 1834)      No carotid or vertebral artery stenosis         No focal intracranial stenosis or aneurysm. Findings were directly communicated with Be Jolly via [facility] text at 6:32 p.m. on 9/1/2023. Workstation performed: [id number]         CT stroke alert brain   Final Result by Margaux Blanco DO (09/01 1817)      No acute intracranial abnormality. Findings were directly discussed with Be Jolly at 6:15 p.m. on 9/1/2023.       Workstation performed: [id number]           09/01 EKG result: NSR    Results from last 7 days   Lab Units 09/01/23 1823   SARS-COV-2  Negative     Results from last 7 days   Lab Units 09/02/23 0457 09/01/23  1821   WBC Thousand/uL 6.49 9.01   HEMOGLOBIN g/dL 12.5 15.1   HEMATOCRIT % 40.0 47.7*   PLATELETS Thousands/uL 193 234   NEUTROS ABS Thousands/µL 4.14  --          Results from last 7 days   Lab Units 09/02/23 0457 09/01/23  1821   SODIUM mmol/L 138 137   POTASSIUM mmol/L 3.8 3.7   CHLORIDE mmol/L 107 103   CO2 mmol/L 26 27   ANION GAP mmol/L 5 7   BUN mg/dL 9 8   CREATININE mg/dL 0.67 0.63   EGFR ml/min/1.73sq m 117 119   CALCIUM mg/dL 8.6 9.6   MAGNESIUM mg/dL 2.1  --      Results from last 7 days   Lab Units 09/02/23 0457   AST U/L 26   ALT U/L 25   ALK PHOS U/L 67   TOTAL PROTEIN g/dL 6.2*   ALBUMIN g/dL 3.7   TOTAL BILIRUBIN mg/dL 0.53     Results from last 7 days   Lab Units 09/01/23  1822   POC GLUCOSE mg/dl 107     Results from last 7 days   Lab Units 09/02/23 0457 09/01/23  1821   GLUCOSE RANDOM mg/dL 95 96             No results found for: "BETA-HYDROXYBUTYRATE"                   Results from last 7 days   Lab Units 09/01/23 2022 09/01/23  1821   HS TNI 0HR ng/L  --  3   HS TNI 2HR ng/L 3  --    HSTNI D2 ng/L 0  --          Results from last 7 days   Lab Units 09/01/23  1821   PROTIME seconds 12.2   INR  0.88   PTT seconds 27     Results from last 7 days   Lab Units 09/01/23  1821   TSH 3RD GENERATON uIU/mL 2.065            Results from last 7 days   Lab Units 09/01/23  1821   CRP mg/L 6.6*                 Results from last 7 days   Lab Units 09/01/23  1823   INFLUENZA A PCR  Negative   INFLUENZA B PCR  Negative   RSV PCR  Negative            ED Treatment:   Medication Administration from 09/01/2023 1757 to 09/01/2023 2038     None        Past Medical History:   Diagnosis Date   • Concussion      Present on Admission:  • Stroke-like symptoms      Admitting Diagnosis: Blurred vision [H53.8]  Acute right-sided weakness [R53.1]  Blurry vision, bilateral [H53.8]  Age/Sex: 34 y.o. female  Admission Orders:  SCD  PT/OT/ST  Baseline NIH stroke scale on admission, reassess Q24H x 2 D, Nursing dysphagia screen prior to staring diet, neuro checks. Scheduled Medications:  aspirin, 81 mg, Oral, Daily  atorvastatin, 40 mg, Oral, QPM  enoxaparin, 40 mg, Subcutaneous, Daily  ergocalciferol, 50,000 Units, Oral, Weekly  LORazepam (ATIVAN) injection 1 mg IV once    Continuous IV Infusions:  sodium chloride 0.9 % infusion  Rate: 75 mL/hr Dose: 75 mL/hr  Freq: Continuous Route: IV  Indications of Use: IV Hydration  Last Dose: Stopped (09/02/23 0958)  Start: 09/01/23 2145 End: 09/02/23 1515     PRN Meds:  acetaminophen, 650 mg, Oral, Q6H PRN 09/02 x 1        IP CONSULT TO CASE MANAGEMENT  IP CONSULT TO NEUROLOGY  IP CONSULT TO CASE MANAGEMENT  IP CONSULT TO NUTRITION SERVICES    Network Utilization Review Department  ATTENTION: Please call with any questions or concerns to [phone] and carefully listen to the prompts so that you are directed to the right person.  All voicemails are confidential.  Ranjit Ly all requests for admission clinical reviews, approved or denied determinations and any other requests to dedicated fax number below belonging to the campus where the patient is receiving treatment.  List of dedicated fax numbers for the Facilities:  [facility] JOANNVALERIE (Administrative/Medical Necessity) [phone] [address] (Maternity/NICU/Pediatrics) [phone]   [address] [place] [address] [phone]   [facility] [phone]   [address] [phone]   [address] [address] [phone]

## 2023-09-03 NOTE — TELEMEDICINE
TeleConsultation - Neurology   Mary Bro 34 y.o. female MRN: [id number]  Unit/Bed#: -01 Encounter: [id number]        REQUIRED DOCUMENTATION:     1. This service was provided via Telemedicine. 2. Provider located at [facility]. 3. TeleMed provider: Miguel A Gordon MD.  4. Identify all parties in room with patient during tele consult: Pt's   5. Patient was then informed that this was a Telemedicine visit and that the exam was being conducted confidentially over secure lines. Other methods to assure confidentiality were taken including: No one else was in the room. Patient acknowledged consent and understanding of privacy and security of the Telemedicine visit, and gave us permission to have the assistant stay in the room in order to assist with the history and to conduct the exam.  I informed the patient that I have reviewed their record in Epic and presented the opportunity for them to ask any questions regarding the visit today. The patient agreed to participate. Assessment/Plan     No new Assessment & Plan notes have been filed under this hospital service since the last note was generated. Service: Neurology    Etiology of her symptoms is uncertain as the constellation of symptoms and progression is unusual.  She reported sudden onset bilateral blurry vision, followed by sudden onset metallic taste a few hours later (lasted nearly 24 hours), which was subsequently followed soon thereafter by self-limited (30 minutes) sudden onset numbness of her entire nose, and then by sudden onset weakness in her right arm with transient medial upper arm tingling (resolved after 8 hours). The weakness in her right arm has persisted, and she is not aware of it worsening, and she has at some point developed right leg weakness, which she only noticed during neuro exams after admission.   Fortunately, her vision has improved, and on an improvised pinhole test (patient looking with 1 eye through a small opening between her fingers and thumbs) her vision seem to be normal, suggesting that even if her current visual deficits persist, it would be correctable with glasses. It was already found to have a low vitamin D level (supplementation started by primary team) and low B12 levels. Based on the patient's own description of her symptoms, her condition may have plateaued, and given the improvement that she reports in her vision, she will hopefully continue to improve spontaneously. That being said, primary team had concern for worse weakness today, and her leg weakness fairly clearly developed during the hospitalization given that she did not have any points on the initial stroke scale when she presented and no weakness was noticed or appreciated in the ED or by the patient at the time of presentation, when she was able to walk into the hospital.  She had her MRI brain and orbits with and without contrast today, which was unrevealing as to the cause of her symptoms. It did reveal a small focus of enhancement with associated susceptibility, which is of uncertain etiology, but it is in the right frank, and cannot clearly explain any of her symptoms or exam findings. Imaging:  CT head, CTA head/neck were unremarkable  MRI brain and orbits w/wo: "Tiny focus of abnormal signal within the right paramedian frank which is faintly hyperintense on T2/FLAIR imaging, demonstrates subtle susceptibility on [place] imaging and punctate enhancement after administration of contrast on multiple sequences."  Report indicates that it is most likely a small vascular abnormality, but etiology uncertain, and differential includes a tiny subacute infarct with hemorrhagic conversion but there is no abnormality on perfusion imaging, and metastatic disease is unlikely given her age.     -Recommend transfer to [address] [place] [address] for in person neurology evaluation due to the uncertainty as to the etiology of her ongoing and seemingly significant right-sided weakness and numbness, and for in person ophthalmology evaluation given her ongoing visual deficits. Patient requested time to consider how to proceed with ongoing evaluation, with options including following through with the recommended transfer to the [address] [place] [address] vs sending the testing below and discharging her while waiting for the results and seeing if she continues to improve spontaneously, although, again, I am concerned that her weakness may be worsening instead of improving, and because the etiology is unclear and there is no clear pattern to the symptoms she has developed, it is unclear what symptoms may occur if she were to worsen. - Will send extensive laboratory testing, to evaluate for potential etiologies of her symptoms/exam findings. Methylmalonic acid, homocystine, folate, thiamine, B6, Lyme, RPR, SHALINI with reflex testing, ANCA panel, Sjogren's panel, RF, ESR, CRP, dsDNA, CK, SPEP, heavy metal screen  - after labs are drawn start b12 supplementation 1000mcg IM daily for 3 doses followed by 1000mcg PO daily. - start multivitamin in case other nutritional deficiencies are present  - MRI C-spine with and without contrast, although anything found unlikely to be able to explain vision symptoms or already resolved nose and taste symptoms.  -Consult ophthalmology. Inpatient versus outpatient, depending on if she agrees to the recommended transfer. -May need to obtain an LP. -If symptoms persist may need an EMG, but acutely it would be of limited utility and should ideally wait at least 2, if not 3 weeks from symptom onset before obtaining.  - aspirin and lipitor were stopped after her MRI confirmed that a stroke was not the cause of her symptoms and the abnormal fining in the right frank is felt to be extremely unlikely to be a stroke. Recommendations for outpatient neurological follow up have yet to be determined.           Reason for Consult / Principal Problem: Sudden onset vision loss and right-sided weakness of uncertain etiology. Hx and PE limited by: inherent limitations of completing a neurologic exam during a telemedicine encounter. HPI: Navdeep Vera is a 34 y.o. right handed female who presents with no significant medical history on no home medications presented as a stroke alert due to new onset bilateral blurry vision and right-sided weakness. In the ED she reported that she developed blurry vision while at work suddenly around 6 AM.  The seem to involve the entirety of her vision in both eyes, which she initially blamed on eyestrain looking at the computer screen for too long and fatigue. Then, around 3pm developed sudden onset right arm weakness/heaviness and the right side of her face felt weird. She did not initially report any symptoms in her legs, but her sister reported that patient tripped on the way into the ED. Prior to 11 AM she had a normal day, and has been in her usual state of health with no recent illnesses or injuries. Exam by the ED provider found that her pupils were equal, round, and reactive, visual fields were full with eyes tested separately and together. Visual acuity was 20/50 with both eyes open, and 20/70 on the right and left with each eye tested independently. She was also found to have mild weakness in the right arm including , with mild drift. The rest of her exam was reported to be normal.  BP on arrival was 141/97. She reports no change in her symptoms since they began.     Per the ED provider she denied any eye pain, pain with eye movements, light sensitivity, or any other eye/ocular symptoms. She did endorse a mild headache, however. She does not take any medications normally, nor has she taken any this week.     CBC, BMP, coags, were normal.  CRP mildly elevated at 6.6     NIHSSS 1 for right arm drift     CT head wo: Normal, no acute findings.   CTA head/neck: Normal, no significant stenosis.    ------------------------------------------------------------------  The information above was obtained during the stroke alert on 9/1 per discussion with the emergency department. The information below was obtained during telemedicine evaluation of the patient on 9/3  -----------------------------------------------------------------        She called her  to pick her up from work after her vision became blurry around 11 AM on 9/1 because she was unable to work. When she got home she laid down to rest  Then around 3pm, while still laying in bed, she developed a metallic taste in her mouth, followed by numbness of her whole nose about half an hour later, and about half an hour after that she developed right arm weakness. She describes it the arm starting to feel tingly, and then when she tried to use the arm, she realized it was weak too. When the symptoms started in the arm, the numbness of her nose seemed to stop right around the same time, and has not recurred. The tingling in the arm was primarily in the medial aspect of her upper arm, and it resolved around 1am the night she presented. The metallic taste was still present, and lasted until the following morning when it gradually resolved that morning. The weakness in her right arm persists, especially her hand causing her to have trouble cutting her food, and she has a hard time keeping her right arm up to wash or brush her hair. She has been having to do a lot more with her left hand than she is used to while in the hospital.  When she does have to use her right arm, what strength she has fatigues very quickly and she has to take breaks, like when she was showering last night and her left arm was wrapped up because of her IV. She did note that she has been able to type/text normally on her phone with no more than her usual frequency of typos. She describes her vision change as blurry, and kind of like looking through a cloud. She denies any change in color perception. She was completely unable to read the first day, but when she woke up the next morning, her vision had started to improve and she could make out at least large letters. She had to work to focus on the the large print Becky Issa her  bought her. Her vision further improved as the day went on yesterday, but was unchanged when she woke up this morning, and has not noticed any additional improvement. She has not noticed anything worsening in the hospital since at least yesterday morning when she first realized her right leg was effected. When working with PT yesterday she and PT noticed that her right foot was dragging, and she had to focus/concentrate on lifting her right foot when walking. She is not sure when she started having weakness in her right leg. The providers on the floor have found some weakness in her leg proximally and distally. She has not had any symptoms in her left arm or leg. No recent joint pain, rashes, dry eyes or dry mouth. She reports that she has had "a lot" of concussions in her life, the last 1 being about 10 years ago when she fell off a horse. She reports that she tends to get 1 headache a month around her menstrual cycle. She had a mild headache the day her symptoms started, which she is pretty confident was caused by eye strain, stress, etc.      She feels like it is easier to focus on things further away than closer. She has not had any illnesses in several months. The last time she was sick was probably February. 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 No known toxin exposure. Vit B12 and D were low. Inpatient consult to Neurology  Consult performed by: Jil Rodriguez MD  Consult ordered by: Lydia Jacob MD           Review of Systems: As above. 10 point ROS completed, and otherwise unremarkable and not pertinent to the HPI.       Historical Information   Past Medical History:   Diagnosis Date   • Concussion      Past Surgical History:   Procedure Laterality Date   • KNEE ARTHROSCOPY       Social History   Social History     Substance and Sexual Activity   Alcohol Use Not Currently   Social EtOH, maybe couple drinks once a week    Social History     Substance and Sexual Activity   Drug Use Never     E-Cigarette/Vaping   • E-Cigarette Use Never User      E-Cigarette/Vaping Substances     Social History     Tobacco Use   Smoking Status Never   Smokeless Tobacco Never     Family History:   Family History   Adopted: Yes   She has contact with her mother's side of the family. DM and heart issues on her father's side  DM, heart issues, cardiomyopathy, and cancer on her mother's side. Grandparent had a TIA and HTN. Review of previous medical records was completed. Meds/Allergies   all current active meds have been reviewed, current meds:   Current Facility-Administered Medications   Medication Dose Route Frequency   • acetaminophen (TYLENOL) tablet 650 mg  650 mg Oral Q6H PRN   • aspirin chewable tablet 81 mg  81 mg Oral Daily   • atorvastatin (LIPITOR) tablet 40 mg  40 mg Oral QPM   • enoxaparin (LOVENOX) subcutaneous injection 40 mg  40 mg Subcutaneous Daily   • ergocalciferol (VITAMIN D2) capsule 50,000 Units  50,000 Units Oral Weekly    and PTA meds:   None       Allergies   Allergen Reactions   • Latex Swelling and Rash     REDNESS, FINGERS SWELL, RASH       OBJECTIVE  Vitals:  Temp:  [97.3 °F (36.3 °C)-97.9 °F (36.6 °C)] 97.7 °F (36.5 °C)  HR:  [63-86] 84  Resp:  [15-18] 18  BP: (100-128)/(59-82) 106/63  SpO2:  [95 %-97 %] 97 %   Body mass index is 38.09 kg/m². Exam completed via video:  GENERAL PHYSICAL EXAMINATION   Constitutional: No acute distress. Pleasant, well-groomed. ENMT:  Dentition is good  Pulmonary: normal respiratory effort. Musculoskeletal: Neck with normal ROM. See below. Skin: There are no rashes or lesions noted. Psych: See mental status below     NEUROLOGICAL EXAMINATION   Mental Status: Mood and affect normal, alert and oriented to person, place and time. Knows the identity of the President of the [facility]. Able to provide a detailed history. Language: Spontaneous & fluent with good comprehension, normal object naming, able to follow complex commands. Able to describe the cookie theft picture, but needed prompting to describe everything (this was likely due to her reporting that she has been shown this picture multiple times a day since she has been in the hospital and is probably tired of describing it over and over again). Cranial Nerves:   II, III: no gross visual field defects apparent on observation. I instructed her on how to do a pinhole test on herself by making a small hole to look through using by pushing the tips of her 1st and 2nd digits from each hand against the same digit in the opposite hand with the 2 fingers in a pincer grasp position, which allows for her adjust the size of the hole to be just small enough to see well through. (1st attempted to make a hole in a piece of cardstock, but the holes weren't clean and she could not see through them well). III, IV, VI: Extraocular movements intact. No clear nystagmus, no ptosis. V:  Light touch equal in v1-v3 (exam completed by pt's  while I observed)  VII: Symmetrical face and expression. No weakness of orbicularis oculi or orbicularis oris. VIII: Hearing intact to voice. IX, X: No dysarthria. XI:Full and symmetric ROM in trapezius (shoulder shrug) & sternocleidomastoid bilaterally. XII: Tongue is midline with symmetric movement. There is no tongue atrophy or fasciculation. Motor examination: There is no apparent atrophy. She is unable to sustain antigravity without downward drift in the right arm and leg. Fine motor movements are somewhat slower and more clumsy in the right hand compared to the left.   No fasciculations or spontaneous muscle movements are noted. The following percentages represent the relative strength of the listed muscles/actions on the right as opposed to 100% on the left, as reported by her  who I instructed on the exam while I observed. 50%   65-70% biceps  60% triceps  40-45% deltoid    50% hip flexion  60% knee ext  60 knee flex  50-55% dorsiflex  50-55% plantarflex    Sensory exam:  LT and temp about 75% in the right forearm compared to the left  LT and temp equal in the LEs over the anterior lower leg. Coordination: Finger to nose and heel to shin intact B/L without clear dysmetria. Right leg seemed to quickly fatigue (video was a little glitchy at this precise time). There is no tremor noted. Lab Results:   I have personally reviewed pertinent reports. , CBC:   Results from last 7 days   Lab Units 09/02/23 0457 09/01/23 1821   WBC Thousand/uL 6.49 9.01   RBC Million/uL 4.26 5.13*   HEMOGLOBIN g/dL 12.5 15.1   HEMATOCRIT % 40.0 47.7*   MCV fL 94 93   PLATELETS Thousands/uL 193 234   , BMP/CMP:   Results from last 7 days   Lab Units 09/02/23 0457 09/01/23  1821   SODIUM mmol/L 138 137   POTASSIUM mmol/L 3.8 3.7   CHLORIDE mmol/L 107 103   CO2 mmol/L 26 27   BUN mg/dL 9 8   CREATININE mg/dL 0.67 0.63   CALCIUM mg/dL 8.6 9.6   AST U/L 26  --    ALT U/L 25  --    ALK PHOS U/L 67  --    EGFR ml/min/1.73sq m 117 119   , Vitamin B12:   Results from last 7 days   Lab Units 09/01/23  1821   VITAMIN B 12 pg/mL 222   , TSH:   Results from last 7 days   Lab Units 09/01/23  1821   TSH 3RD GENERATON uIU/mL 2.065   , Coagulation:   Results from last 7 days   Lab Units 09/01/23 1821   INR  0.88   , Lipid Profile:   Results from last 7 days   Lab Units 09/02/23 0457   HDL mg/dL 34*   LDL CALC mg/dL 117*   TRIGLYCERIDES mg/dL 157*     Imaging Studies: I have personally reviewed pertinent reports.    and I have personally reviewed pertinent films in PACS      Time spent completing this telemedicine evaluation:  127 minutes of direct patient contact during the televideo evaluation, not including time spent disconnecting and reconnecting to the telemedicine several times, and 15 minutes reviewing relevant documentation, test results and imaging, and previous records.

## 2023-09-04 ENCOUNTER — APPOINTMENT (INPATIENT)
Dept: NON INVASIVE DIAGNOSTICS | Facility: HOSPITAL | Age: 33
End: 2023-09-04
Payer: COMMERCIAL

## 2023-09-04 PROBLEM — E78.5 HYPERLIPIDEMIA: Status: ACTIVE | Noted: 2023-09-04

## 2023-09-04 PROBLEM — R53.1 ACUTE RIGHT-SIDED WEAKNESS: Status: ACTIVE | Noted: 2023-09-01

## 2023-09-04 PROBLEM — H53.133 ACUTE LOSS OF VISION, BILATERAL: Status: ACTIVE | Noted: 2023-09-04

## 2023-09-04 LAB
AORTIC ROOT: 2.6 CM
APICAL FOUR CHAMBER EJECTION FRACTION: 57 %
ASCENDING AORTA: 2.3 CM
E WAVE DECELERATION TIME: 180 MS
FRACTIONAL SHORTENING: 31 (ref 28–44)
HCYS SERPL-SCNC: 6.2 UMOL/L (ref 5–15)
INTERVENTRICULAR SEPTUM IN DIASTOLE (PARASTERNAL SHORT AXIS VIEW): 0.8 CM
INTERVENTRICULAR SEPTUM: 0.8 CM (ref 0.6–1.1)
LAAS-AP2: 14.7 CM2
LAAS-AP4: 13.2 CM2
LEFT ATRIUM SIZE: 2.8 CM
LEFT ATRIUM VOLUME (MOD BIPLANE): 31 ML
LEFT INTERNAL DIMENSION IN SYSTOLE: 3.1 CM (ref 2.1–4)
LEFT VENTRICULAR INTERNAL DIMENSION IN DIASTOLE: 4.5 CM (ref 3.5–6)
LEFT VENTRICULAR POSTERIOR WALL IN END DIASTOLE: 0.8 CM
LEFT VENTRICULAR STROKE VOLUME: 51 ML
LVSV (TEICH): 51 ML
MV E'TISSUE VEL-SEP: 12 CM/S
MV PEAK A VEL: 0.53 M/S
MV PEAK E VEL: 63 CM/S
MV STENOSIS PRESSURE HALF TIME: 52 MS
MV VALVE AREA P 1/2 METHOD: 4.23
RIGHT ATRIUM AREA SYSTOLE A4C: 12.6 CM2
RIGHT VENTRICLE ID DIMENSION: 3.3 CM
SL CV LEFT ATRIUM LENGTH A2C: 4.9 CM
SL CV LV EF: 55
SL CV PED ECHO LEFT VENTRICLE DIASTOLIC VOLUME (MOD BIPLANE) 2D: 90 ML
SL CV PED ECHO LEFT VENTRICLE SYSTOLIC VOLUME (MOD BIPLANE) 2D: 39 ML
TRICUSPID ANNULAR PLANE SYSTOLIC EXCURSION: 1.7 CM

## 2023-09-04 PROCEDURE — 97163 PT EVAL HIGH COMPLEX 45 MIN: CPT

## 2023-09-04 PROCEDURE — 93306 TTE W/DOPPLER COMPLETE: CPT | Performed by: INTERNAL MEDICINE

## 2023-09-04 PROCEDURE — 99232 SBSQ HOSP IP/OBS MODERATE 35: CPT | Performed by: PHYSICIAN ASSISTANT

## 2023-09-04 PROCEDURE — 83825 ASSAY OF MERCURY: CPT

## 2023-09-04 PROCEDURE — 83655 ASSAY OF LEAD: CPT

## 2023-09-04 PROCEDURE — 82300 ASSAY OF CADMIUM: CPT | Performed by: INTERNAL MEDICINE

## 2023-09-04 PROCEDURE — 93306 TTE W/DOPPLER COMPLETE: CPT

## 2023-09-04 PROCEDURE — 97167 OT EVAL HIGH COMPLEX 60 MIN: CPT

## 2023-09-04 PROCEDURE — 82570 ASSAY OF URINE CREATININE: CPT

## 2023-09-04 PROCEDURE — 82300 ASSAY OF CADMIUM: CPT

## 2023-09-04 RX ORDER — CYANOCOBALAMIN 1000 UG/ML
1000 INJECTION, SOLUTION INTRAMUSCULAR; SUBCUTANEOUS DAILY
Status: DISCONTINUED | OUTPATIENT
Start: 2023-09-04 | End: 2023-09-05 | Stop reason: HOSPADM

## 2023-09-04 RX ORDER — LORAZEPAM 2 MG/ML
0.5 INJECTION INTRAMUSCULAR ONCE AS NEEDED
Status: COMPLETED | OUTPATIENT
Start: 2023-09-04 | End: 2023-09-05

## 2023-09-04 RX ORDER — MELATONIN
2000 DAILY
Status: DISCONTINUED | OUTPATIENT
Start: 2023-09-04 | End: 2023-09-05 | Stop reason: HOSPADM

## 2023-09-04 RX ORDER — LORAZEPAM 2 MG/ML
1 INJECTION INTRAMUSCULAR ONCE
Status: COMPLETED | OUTPATIENT
Start: 2023-09-04 | End: 2023-09-04

## 2023-09-04 RX ORDER — ATORVASTATIN CALCIUM 40 MG/1
40 TABLET, FILM COATED ORAL
Status: DISCONTINUED | OUTPATIENT
Start: 2023-09-04 | End: 2023-09-05 | Stop reason: HOSPADM

## 2023-09-04 RX ADMIN — LORAZEPAM 1 MG: 2 INJECTION INTRAMUSCULAR; INTRAVENOUS at 08:54

## 2023-09-04 RX ADMIN — ATORVASTATIN CALCIUM 40 MG: 40 TABLET, FILM COATED ORAL at 16:08

## 2023-09-04 RX ADMIN — CYANOCOBALAMIN 1000 MCG: 1000 INJECTION, SOLUTION INTRAMUSCULAR at 08:59

## 2023-09-04 RX ADMIN — ENOXAPARIN SODIUM 40 MG: 40 INJECTION SUBCUTANEOUS at 08:58

## 2023-09-04 RX ADMIN — Medication 1 TABLET: at 08:58

## 2023-09-04 RX ADMIN — Medication 2000 UNITS: at 14:47

## 2023-09-04 NOTE — ASSESSMENT & PLAN NOTE
Initially presented to [address] [place] [address] with acute onset bilateral blurred vision beginning at 11 AM while looking at computer while at work, then at 3 PM developed nose numbness and metallic taste in mouth followed by right-sided facial heaviness and right upper extremity weakness. Right upper extremity weakness persist at time of admission, then noted to develop right lower extremity weakness in addition to ongoing visual complaints. Neurology recommended transfer to [facility] for further workup/evaluation. • NIH 1- No TNK per neurology  • CT/CTA brain without ischemia, infarct, bleed, lesion or LVO  · MRI brain: Tiny focus of abnormal signal within the right paramedian frank. ..nonspecific for a patient of this age and differential considerations would include a small slow flow vascular abnormality such as cavernous angioma or hereditary telangiectasia. A small subacute infarct is considered less likely given the lack of diffusion abnormality. More aggressive etiologies such as parenchymal metastasis also considered less likely given patient's age.    • Lipid panel with ; A1c pending   • Vit D low at 15.4, started on oral supplementation   • Vit B12 low-normal at 222, ordered IM and PO supplementation per neurology; follow-up B6 and B1 levels  • Appreciate ongoing neurology recommendations   o Aspirin stopped after MRI confirmed that a stroke was not the cause of her symptoms and the abnormal fining in the right frank is felt to be extremely unlikely to be a stroke.  o MRI cervical spine ordered and pending, although would not account for visual symptoms   o Echocardiogram with bubble study ordered and pending   o ESR normal, CRP mildly elevated, CK normal   o Pending labs: Methylmalonic acid, homocystine, folate, thiamine, Lyme, RPR, SHALINI with reflex testing, ANCA panel, Sjogren's panel, RF, dsDNA, SPEP, heavy metal screen  o Ophthalmology consult for visual symptoms, see plan below   • Telemetry monitoring prior to transfer did not reveal any tachy or bradycardia arrhythmias  • Speech/PT/OT evaluations appreciated

## 2023-09-04 NOTE — PLAN OF CARE
Problem: PAIN - ADULT  Goal: Verbalizes/displays adequate comfort level or baseline comfort level  Description: Interventions:  - Encourage patient to monitor pain and request assistance  - Assess pain using appropriate pain scale  - Administer analgesics based on type and severity of pain and evaluate response  - Implement non-pharmacological measures as appropriate and evaluate response  - Consider cultural and social influences on pain and pain management  - Notify physician/advanced practitioner if interventions unsuccessful or patient reports new pain  Outcome: Progressing     Problem: INFECTION - ADULT  Goal: Absence or prevention of progression during hospitalization  Description: INTERVENTIONS:  - Assess and monitor for signs and symptoms of infection  - Monitor lab/diagnostic results  - Monitor all insertion sites, i.e. indwelling lines, tubes, and drains  - Monitor endotracheal if appropriate and nasal secretions for changes in amount and color  - Elizabeth appropriate cooling/warming therapies per order  - Administer medications as ordered  - Instruct and encourage patient and family to use good hand hygiene technique  - Identify and instruct in appropriate isolation precautions for identified infection/condition  Outcome: Progressing  Goal: Absence of fever/infection during neutropenic period  Description: INTERVENTIONS:  - Monitor WBC    Outcome: Progressing     Problem: SAFETY ADULT  Goal: Patient will remain free of falls  Description: INTERVENTIONS:  - Educate patient/family on patient safety including physical limitations  - Instruct patient to call for assistance with activity   - Consult OT/PT to assist with strengthening/mobility   - Keep Call bell within reach  - Keep bed low and locked with side rails adjusted as appropriate  - Keep care items and personal belongings within reach  - Initiate and maintain comfort rounds  - Make Fall Risk Sign visible to staff  - Offer Toileting every 2 Hours, in advance of need  - Initiate/Maintain bed alarm  - Obtain necessary fall risk management equipment  - Apply yellow socks and bracelet for high fall risk patients  - Consider moving patient to room near nurses station  Outcome: Progressing  Goal: Maintain or return to baseline ADL function  Description: INTERVENTIONS:  -  Assess patient's ability to carry out ADLs; assess patient's baseline for ADL function and identify physical deficits which impact ability to perform ADLs (bathing, care of mouth/teeth, toileting, grooming, dressing, etc.)  - Assess/evaluate cause of self-care deficits   - Assess range of motion  - Assess patient's mobility; develop plan if impaired  - Assess patient's need for assistive devices and provide as appropriate  - Encourage maximum independence but intervene and supervise when necessary  - Involve family in performance of ADLs  - Assess for home care needs following discharge   - Consider OT consult to assist with ADL evaluation and planning for discharge  - Provide patient education as appropriate  Outcome: Progressing  Goal: Maintains/Returns to pre admission functional level  Description: INTERVENTIONS:  - Perform BMAT or MOVE assessment daily.   - Set and communicate daily mobility goal to care team and patient/family/caregiver.    - Collaborate with rehabilitation services on mobility goals if consulted  - Record patient progress and toleration of activity level   Outcome: Progressing     Problem: DISCHARGE PLANNING  Goal: Discharge to home or other facility with appropriate resources  Description: INTERVENTIONS:  - Identify barriers to discharge w/patient and caregiver  - Arrange for needed discharge resources and transportation as appropriate  - Identify discharge learning needs (meds, wound care, etc.)  - Arrange for interpretive services to assist at discharge as needed  - Refer to Case Management Department for coordinating discharge planning if the patient needs post-hospital services based on physician/advanced practitioner order or complex needs related to functional status, cognitive ability, or social support system  Outcome: Progressing     Problem: Knowledge Deficit  Goal: Patient/family/caregiver demonstrates understanding of disease process, treatment plan, medications, and discharge instructions  Description: Complete learning assessment and assess knowledge base. Interventions:  - Provide teaching at level of understanding  - Provide teaching via preferred learning methods  Outcome: Progressing     Problem: Neurological Deficit  Goal: Neurological status is stable or improving  Description: Interventions:  - Monitor and assess patient's level of consciousness, motor function, sensory function, and level of assistance needed for ADLs. - Monitor and report changes from baseline. Collaborate with interdisciplinary team to initiate plan and implement interventions as ordered. - Provide and maintain a safe environment. - Consider seizure precautions. - Consider fall precautions. - Consider aspiration precautions. - Consider bleeding precautions. Outcome: Progressing     Problem: Activity Intolerance/Impaired Mobility  Goal: Mobility/activity is maintained at optimum level for patient  Description: Interventions:  - Assess and monitor patient  barriers to mobility and need for assistive/adaptive devices. - Assess patient's emotional response to limitations. - Collaborate with interdisciplinary team and initiate plans and interventions as ordered. - Encourage independent activity per ability.  - Maintain proper body alignment. - Perform active/passive rom as tolerated/ordered. - Plan activities to conserve energy.  - Turn patient as appropriate  Outcome: Progressing     Problem: Communication Impairment  Goal: Ability to express needs and understand communication  Description: Assess patient's communication skills and ability to understand information.   Patient will demonstrate use of effective communication techniques, alternative methods of communication and understanding even if not able to speak. - Encourage communication and provide alternate methods of communication as needed. - Collaborate with case management/ for discharge needs. - Include patient/family/caregiver in decisions related to communication. Outcome: Progressing     Problem: Potential for Aspiration  Goal: Ventilated patient's risk of aspiration is minimized  Description: Assess and monitor vital signs, respiratory status, airway cuff pressure, and labs (WBC). Monitor for signs of aspiration (tachypnea, cough, rales, wheezing, cyanosis, fever). - Elevate head of bed 30 degrees if patient has tube feeding.  - Monitor tube feeding.   Outcome: Progressing

## 2023-09-04 NOTE — CONSULTS
This 16-year-old woman presented to [address] [place] [address] complaining of acute onset blurry vision in both eyes and right-sided facial numbness and right upper extremity weakness. Work-up so far has been significant only for vitamin D and B12 deficiencies. The patient reports multiple concussions in the past from sports and horseback riding injuries. Past ocular history is significant for requiring reading glasses for the last year. The patient reports that her vision has essentially returned to baseline from her standpoint. She says everything was "cloudy", both at distance and near in each eye separately. She denies double vision and has had no eye discomfort. On examination, visual acuity without correction at near is 20/70 OU. Pupils are equal round and reactive to light with no afferent pupil defect. Extraocular movements are full however she has a large V-pattern exotropia with diplopia on upgaze. She was unaware of this circumstance. Confrontation visual fields are full. The external examination is unremarkable. Intraocular pressures are 19 and 13. Both eyes were dilated with Mydriacyl and Todd-Synephrine at 4 PM.    The media is clear in both eyes. The optic nerves are pink and flat with 0.1 cupping. The macula, vessels and periphery are unremarkable in both eyes. Impression: Amaurosis fugax. Unremarkable eye exam other than V-pattern exotropia which is most likely incidental.  The patient did mention that she owns chickens, goats and horses. Consideration of zoonoses could be added to the work-up with an infectious disease consultation. Plan: Observation. The patient will follow-up in my office after discharge.

## 2023-09-04 NOTE — PHYSICAL THERAPY NOTE
PHYSICAL THERAPY EVALUATION  NAME:  Ayaka Garcia  DATE: 09/04/23    AGE:   34 y.o. Mrn:   [id number]  ADMIT DX:  Stroke-like symptoms    Past Medical History:   Diagnosis Date    Concussion     Concussion      Past Surgical History:   Procedure Laterality Date    KNEE ARTHROSCOPY         Length Of Stay: 1  PHYSICAL THERAPY EVALUATION :    09/04/23 1046   PT Last Visit   PT Visit Date 09/04/23   Note Type   Note type Evaluation   Pain Assessment   Pain Assessment Tool 0-10   Pain Score No Pain   Restrictions/Precautions   Weight Bearing Precautions Per Order No   Other Precautions Fall Risk;Visual impairment   Home Living   Type of Home House   Home Layout Multi-level  (bi level w/ 4+4 steps to access main bed and bathroom. 2 BILL through garage)   Bathroom Shower/Tub Tub/shower unit   [address] [facility]   (none)   Home Equipment   (none)   Prior Function   Level of [place] Independent with ADLs; Independent with functional mobility; Independent with IADLS   Lives With Spouse; Son   [address] Help From Family   IADLs Independent with driving; Independent with meal prep; Independent with medication management  (likes to ride horses)   Falls in the last 6 months 0   Vocational Full time employment  (manager)   Comments At baseline pt reports being fully indep and not using AD; living w/ spouse and 3 small kids in a [facility] driving and working FT;   Cognition   Overall Cognitive Status WFL   Attention Within functional limits   Orientation Level Oriented X4   Memory Within functional limits   Following Commands Follows all commands and directions without difficulty   Subjective   Subjective "I want them to figure out what is going on with all this"   RUE Assessment   RUE Assessment X  (decreased gross ROM and strength)   RUE Strength   RUE Overall Strength Deficits  (3+/5 - noteable weakness compared to L)   LUE Assessment   LUE Assessment WFL  (5/5)   RLE Assessment   RLE Assessment X Strength RLE   R Hip Flexion 3+/5   R Knee Flexion 3+/5   R Knee Extension 3+/5   R Ankle Dorsiflexion 3/5   R Ankle Plantar Flexion 3/5   LLE Assessment   LLE Assessment WFL  (5/5)   Vision-Basic Assessment   Patient Visual Report Blurring of print when reading   Coordination   Movements are Fluid and Coordinated 0   Coordination and Movement Description decreased fine and gross motor coordination in R UE/ hand complated to L - pt describes as "clumsy/ heavy" (pt is R hand dominent   Finger to Nose & Finger to Finger  Intact   Heel to Shin Intact   Rapid Alternating Movements Impaired  (R foot)   Light Touch   RLE Light Touch Grossly intact   LLE Light Touch Grossly intact   Sharp/Dull   RLE Sharp/Dull Grossly intact   LLE Sharp/Dull Grossly intact   Proprioception   RLE Proprioception Grossly intact   LLE Proprioception Grossly Intact   Bed Mobility   Supine to Sit 7  Independent   Sit to Supine 7  Independent   Transfers   Sit to Stand 5  Supervision   Stand to Sit 5  Supervision   Ambulation/Elevation   Gait pattern R Foot drag;Improper Weight shift;Decreased foot clearance;Shuffling;Decreased heel strike   Gait Assistance 5  Supervision   Additional items Verbal cues   Assistive Device Rolling walker;None   Distance 15' w/o AD- very unsteady w/ min/ CGA for balance; 75'+75' w/ RW and clsoe S> CGA- decreaed R foot clearance / dec heel strike. cues for R foot clearance   Stair Management Assistance Not tested   Ambulation/Elevation Additional Comments noteable R LE fatigue + foot drag w/ gait especially pronounced w/ increased fatigue > 75'x2; trials on eval w/ and w/o RW- pt w/ improved safety + confidence w/ use of RW and RW recommended for safety at this time.    Balance   Static Sitting Good   Dynamic Sitting Fair +   Static Standing Fair +   Dynamic Standing Fair   Ambulatory Poor +   Activity Tolerance   Activity Tolerance Patient tolerated treatment well   Medical Staff Made Aware OT jose   Nurse Made Aware yes   Assessment Pt is 34 y.o. female seen for PT evaluation s/p admit to [facility] 9/[facility]/2023. Pt presenting as a transfers from [address] [place] [address] on stroke pathway. Pt initially presenting w/ blurry vision and R UE/ LE weakness "heaviness" . MRI brain and orbits w/wo: "Tiny focus of abnormal signal within the right paramedian frank which is faintly hyperintense on T2/FLAIR imaging, demonstrates subtle susceptibility on [place] imaging and punctate enhancement after administration of contrast on multiple sequences."  Report indicates that it is most likely a small vascular abnormality, but etiology uncertain, and differential includes a tiny subacute infarct with hemorrhagic conversion but there is no abnormality on perfusion imaging, and metastatic disease is unlikely given her age. Pt was xferred to \[facility]\"" for higher level of care + for in person neuro and opthmology evals  Current dx/ problem list includes: stroke like symptoms- workup ongoing. PT consulted for mobility and to assist w/ d/c planning recommendations. Comorbidities affecting pt's physical performance at time of assessment listed above  Due to acute medical issues, ongoing medical workup for primary dx; pain, fall risk, increased reliance on more restrictive AD compared to baseline;  decreased activity tolerance compared to baseline, increased assistance needed from caregiver at current time, continuous telemetry/o2 monitoring, new onset R deficits + visual changes;  multiple lines, decline in overall functional mobility status; health management issues; note unstable clinical picture (high complexity).      At baseline pt reports being fully indep and not using AD; living w/ spouse and 3 small kids in a [address] driving and working FT;      Currently pt  is able to demonstrate S A for bed skills; S for functional transfers and  Close S for ambulation w/ RW w/ noteable R LE fatigue + foot drag w/ gait especially pronounced w/ increased fatigue > 75'x2; trials on eval w/ and w/o RW- pt w/ improved safety + confidence w/ use of RW and RW recommended for safety at this time. Pt requiring min/ CGA + cues for clearance of R LE w/o AD. Pt appropriately concerned re her R UE and LE weakness. Pt presents functioning below baseline and currently w/ overall mobility deficits 2* to: decreased RLE strength/AROM; R fine and groos motor coordination deficits;  limited flexibility;  generalized weakness/ deconditioning; decreased endurance; decreased activity tolerance; decreased coordination ( R hemibody) ; impaired balance; gait deviations;  fatigue;  visual impairments;(Please find additional objective findings from PT assessment regarding body systems outlined above.)     Pt will continue to benefit from skilled PT interventions to address stated impairments; to maximize functional potential; for ongoing pt/ family training; and DME needs. PT is currently recommending d/c home w/ family support and OPPT when medically cleared. RW recommended for now for safety- will cont to assess for appropriate AD upon d/c w/ progress . Pt/ family agreeable to plan and goals as stated on evaluation. Prognosis Good   Problem List Decreased strength;Decreased endurance; Impaired balance;Decreased mobility; Decreased coordination;Decreased cognition; Impaired judgement;Decreased safety awareness; Impaired hearing; Impaired sensation   Goals   Patient Goals to go home to her kids + find out what is wrong   STG Expiration Date 09/16/23   Short Term Goal #1 In 10 days pt will complete: 1) Bed mobility skills with indep to facilitate safe return to previous living environment and decrease burden on caregivers. 2) Functional transfers with indep  to facilitate safe return to previous living environment  3) Ambulation with least restrictive AD > 360' [id number]' without LOB and stable vitals for safe ambulation in home/ community environment.  4) Stair training up/ down flight step/s with indep rail/s  for safe access to previous living environment and to increase community access. 5) Improve balance by 1 grade in order to decrease fall risk. 6) Improve LE strength grades by 1 to increase independence w/ all functional mobility, transfers and gait. 7) PT for ongoing pt and family education; DME needs and D/C planning to promote highest level of function in least restrictive environment. PT Treatment Day 1   Plan   Treatment/Interventions ADL retraining;Functional transfer training;LE strengthening/ROM; Elevations; Therapeutic exercise; Endurance training;Patient/family training;Cognitive reorientation;Equipment eval/education; Bed mobility;Gait training; Compensatory technique education;Continued evaluation;Spoke to MD;Spoke to nursing;Spoke to case management   PT Frequency 3-5x/wk   Recommendation   PT Discharge Recommendation Home with outpatient rehabilitation   Equipment Recommended   (TBD w/ progress closer to d/c- RW for now)   AM-PAC Basic Mobility Inpatient   Turning in Flat Bed Without Bedrails 4   Lying on Back to Sitting on Edge of Flat Bed Without Bedrails 4   Moving Bed to Chair 4   Standing Up From Chair Using Arms 4   Walk in Room 3   Climb 3-5 Stairs With Railing 3   Basic Mobility Inpatient Raw Score 22   Basic Mobility Standardized Score 47.4   Highest Level Of Mobility   JH-HLM Goal 7: Walk 25 feet or more   JH-HLM Achieved 7: Walk 25 feet or more     The patient's AM-PAC Basic Mobility Inpatient Short Form Raw Score is 22. A Raw score of greater than 16 suggests the patient may benefit from discharge to home. Please also refer to the recommendation of the Physical Therapist for safe discharge planning.

## 2023-09-04 NOTE — SPEECH THERAPY NOTE
Speech Language/Pathology  Speech-Language Pathology Screen      Patient Name: Lauren Suarez    SUXEX'O Date: 9/4/2023     Problem List  Active Problems:    Acute right-sided weakness    Acute loss of vision, bilateral      Past Medical History  Past Medical History:   Diagnosis Date   • Concussion    • Concussion        Past Surgical History  Past Surgical History:   Procedure Laterality Date   • KNEE ARTHROSCOPY         Summary   Order received,chart reviewed. Pt screened bedside. Speech is clear, no dysphagia. No formal assessment needed at this time. Current Medical Status  Pt is a 34 y.o. female who presented to [address] [place] [address] 9/1/23 with "blurry vision" around 11 AM while at work today. Patient was working on a computer screen. She thought she was just "tired."  She tried to work on papers but had difficulty doing that as well. Around 3 PM she started to notice that she was having right arm heaviness and "weakness."  She also report that the right side of her face felt what she describes as "weird."  Her sister who accompanied her to the emergency room reports that the patient did stumble coming into the emergency department.

## 2023-09-04 NOTE — CASE MANAGEMENT
Case Management Assessment & Discharge Planning Note    Patient name Clifford Farley  Location [facility] 711/[facility] 222-33 MRN [id number]  : 1990 Date 2023       Current Admission Date: 9/3/2023  Current Admission Diagnosis:Acute right-sided weakness   Patient Active Problem List    Diagnosis Date Noted   • Acute loss of vision, bilateral 2023   • Acute right-sided weakness 2023      LOS (days): 1  Geometric Mean LOS (GMLOS) (days):   Days to GMLOS:     OBJECTIVE:    Risk of Unplanned Readmission Score: 7.51         Current admission status: Inpatient       Preferred Pharmacy:   [address] [place] [address], [address] [place] [address] Dr  [address]  [address] [place] [address] [place] [address]  Phone: [phone] Fax: [phone]    Primary Care Provider: Le Melo    Primary Insurance: BLUE CROSS  Secondary Insurance:     ASSESSMENT:  [place]aitlin Proxies    There are no active Health Care Proxies on file. Readmission Root Cause  30 Day Readmission: No    Patient Information  Admitted from[de-identified] Home  Mental Status: Alert  During Assessment patient was accompanied by: Not accompanied during assessment  Assessment information provided by[de-identified] Patient  Primary Caregiver: Self  Support Systems: Self, Spouse/significant other, [address] of Residence: [address] [place] [address] do you live in?: [facility] entry access options.  Select all that apply.: Stairs  Number of steps to enter home.: 4  Do the steps have railings?: Yes  Type of Current Residence: [address] [place] [address] home  Upon entering residence, is there a bedroom on the main floor (no further steps)?: Yes  Upon entering residence, is there a bathroom on the main floor (no further steps)?: Yes  In the last 12 months, was there a time when you were not able to pay the mortgage or rent on time?: No  In the last 12 months, how many places have you lived?: 1  In the last 12 months, was there a time when you did not have a steady place to sleep or slept in a shelter (including now)?: No  Homeless/housing insecurity resource given?: N/A  Living Arrangements: Lives w/ Spouse/significant other, Lives w/ Son  Is patient a ?: No    Activities of Daily Living Prior to Admission  Functional Status: Independent  Completes ADLs independently?: Yes  Ambulates independently?: Yes  Does patient use assisted devices?: No  Does patient currently own DME?: No  Does patient have a history of Outpatient Therapy (PT/OT)?: No  Does the patient have a history of Short-Term Rehab?: No  Does patient have a history of HHC?: No  Does patient currently have [address]?: No    Patient Information Continued  Income Source: Employed  Does patient have prescription coverage?: Yes  Within the past 12 months, you worried that your food would run out before you got the money to buy more.: Never true  Within the past 12 months, the food you bought just didn't last and you didn't have money to get more.: Never true  Food insecurity resource given?: N/A  Does patient receive dialysis treatments?: No  Does patient have a history of substance abuse?: No  Does patient have a history of Mental Health Diagnosis?: Yes (Post-Partum Depression, Anxiety)  Has patient received inpatient treatment related to mental health in the last 2 years?: No        Means of Transportation  Means of Transport to Appts[de-identified] Drives Self  In the past 12 months, has lack of transportation kept you from medical appointments or from getting medications?: No  In the past 12 months, has lack of transportation kept you from meetings, work, or from getting things needed for daily living?: No  Was application for public transport provided?: N/A      DISCHARGE DETAILS:    Discharge planning discussed with[de-identified] Patient at bedside  Freedom of Choice: Yes     CM contacted family/caregiver?: No- see comments       Additional Comments: CM Resident met with patient at bedside for CM assessment. Patient has no current CM needs.   CM will continue to follow prior to discharge. CM reviewed d/c planning process including the following: identifying help at home, patient preference for d/c planning needs, Discharge Lounge, [facility] Meds to Bed program, availability of treatment team to discuss questions or concerns patient and/or family may have regarding understanding medications and recognizing signs and symptoms once discharged. CM also encouraged patient to follow up with all recommended appointments after discharge. Patient advised of importance for patient and family to participate in managing patient’s medical well being.

## 2023-09-04 NOTE — ASSESSMENT & PLAN NOTE
• POA beginning at 11 AM developed bilateral blurred vision while looking at computer while at work then at 3 PM developed nose numbness, metallic taste in mouth followed by right-sided facial heaviness and right upper extremity weakness. Right upper extremity weakness and blurred vision persist at time of admission. • Patient was admitted to [facility]  • NIH 1- No TNK per neurology  • CT/CTA brain without ischemia, infarct, bleed, lesion or LVO  • 9/3 patient continues to have right upper extremity weakness. Unchanged from yesterday. Reports improvement in blurring of vision. Also has right lower extremity plantar and dorsiflexion weakness  • MRI of the brain was negative for acute stroke  • Patient had a televisit by neurology who recommended transfer to [facility] for in person neurology evaluation  ? Neurology also recommended further studies including MRI of the cervical spine, lumbar puncture   ? Echocardiogram with bubble study ordered and pending   ?  ESR normal, CRP mildly elevated, CK normal   ? Pending labs: Methylmalonic acid, homocystine, folate, thiamine, Lyme, RPR, SHALINI with reflex testing, ANCA panel, Sjogren's panel, RF, dsDNA, SPEP, heavy metal screen  Transferred to [facility] for neurology and ophthalmology evaluation

## 2023-09-04 NOTE — PLAN OF CARE
Problem: OCCUPATIONAL THERAPY ADULT  Goal: Performs self-care activities at highest level of function for planned discharge setting. See evaluation for individualized goals. Description: Treatment Interventions: ADL retraining, Functional transfer training, UE strengthening/ROM, Endurance training, Neuromuscular reeducation, Fine motor coordination activities, Compensatory technique education, Energy conservation, Activityengagement          See flowsheet documentation for full assessment, interventions and recommendations. Note: Limitation: Decreased ADL status, Decreased endurance, Decreased high-level ADLs, Decreased UE strength  Prognosis: Good  Assessment: Pt is a 33 yo female seen for OT eval s/p adm to [facility] on 9/3/23 w/ RUE/RLE weakness, facial numbness, blurred vision dx'd w/ acute R sided weakness. CT brain negative. Per neurology, MRI confirmed stroke was not the cause. Abnormal finding in the R frank. MRI cervical spine ordered and pending. Further workup pending. Pt  has a past medical history of Concussion and Concussion. Pt with active OT orders and activity as tolerated orders. Pt works FT and resides w/ her  and 3 young kids in bi-level home. Pt was I w/ ADLS and IADLS, drove, & required no use of DME PTA. Pt is currently demonstrating the following occupational deficits: S- Min A self care, S functional transfers and mobility w/ RW. These deficits that are impacting pt's baseline areas of occupation are a result of the following impairments: endurance, activity tolerance, functional mobility, balance, decreased I w/ ADLS/IADLS, strength, ROM, impaired fine motor skills and coordination deficits. The following Occupational Performance Areas to address include: grooming, bathing/shower, toilet hygiene, dressing, health maintenance, functional mobility and clothing management.   Based on the aforementioned OT evaluation, functional performance deficits, and assessments, pt has been identified as a high complexity evaluation. Recommend home with family support and outpatient OT for RUE upon D/C. The patient's raw score on the AM-PAC Daily Activity Inpatient Short Form is 20. A raw score of greater than or equal to 19 suggests the patient may benefit from discharge to home. Please refer to the recommendation of the Occupational Therapist for safe discharge planning.   Pt to continue to benefit from acute immediate OT services to address the following goals 2-3x/week to  w/in 10-14 days:     OT Discharge Recommendation: Home with outpatient rehabilitation (outpt OT for RUE)

## 2023-09-04 NOTE — ASSESSMENT & PLAN NOTE
Acute onset bilateral blurred vision as outlined above.  Persisting now, particularly when trying to read patient states she cannot focus on letters/words   · Appreciate ophthalmology consult   · Remainder of plan as outlined above

## 2023-09-04 NOTE — UTILIZATION REVIEW
Initial Clinical Review    Admission: Date/Time/Statement:   Admission Orders (From admission, onward)     Ordered        09/03/23 2242  Inpatient Admission  Once                      Orders Placed This Encounter   Procedures   • Inpatient Admission     Standing Status:   Standing     Number of Occurrences:   1     Order Specific Question:   Level of Care     Answer:   Med Surg [16]     Order Specific Question:   Estimated length of stay     Answer:   More than 2 Midnights     Order Specific Question:   Certification     Answer:   I certify that inpatient services are medically necessary for this patient for a duration of greater than two midnights. See H&P and MD Progress Notes for additional information about the patient's course of treatment. Initial Presentation: 34 y.o. female to [facility] transferred from [facility] where she initially presented with b/l blurred vision while looking at her computer while at work then later developed nose numbness, metallic taste in mouth followed by right-sided facial heaviness and RUE weakness. RUE weakness and blurred vision persist at time of presentation to ED. Admitted initially to [facility] and stroke w/u completed. CT/CTA brain without ischemia, infarct, bleed, lesion or LVO. MRI brain done. Asa 325 mg given. Neuro consulted -- recommended transfer to [facility] for in person neurology eval d/t the uncertainty as to the etiology of her ongoing and seemingly significant right-sided weakness and numbness, and for in person ophthalmology eval given her ongoing visual deficits. On presentation at [facility]  the visual disturbance as well as cranial manifestations are resolved. However, pt still continues to have relative weakness of the right arm and right leg  Admitted inpatient to [facility] unit on Stroke pathway -- Telemetry. Asa 81 daily, lipid panel. -200. Echo pending. Flp, tsh, hba1c, b12, tsh, vit D noted. Vitamin D supplementation started.  PT/OT/ST loraine. Neuro & ophthalm consulted. Neuro (tele) consult  -- Will send extensive laboratory testing, to evaluate for potential etiologies of her symptoms/exam findings. Methylmalonic acid, homocystine, folate, thiamine, B6, Lyme, RPR, SHALINI with reflex testing, ANCA panel, Sjogren's panel, RF, ESR, CRP, dsDNA, CK, SPEP, heavy metal screen  - after labs are drawn start b12 supplementation 1000mcg IM daily for 3 doses followed by 1000mcg PO daily. - start multivitamin in case other nutritional deficiencies are present  - MRI C-spine with and without contrast, although anything found unlikely to be able to explain vision symptoms or already resolved nose and taste symptoms.  -Consult ophthalmology. -May need to obtain an LP. -If symptoms persist may need an EMG, but acutely it would be of limited utility and should ideally wait at least 2, if not 3 weeks from symptom onset before obtaining.  - aspirin and lipitor were stopped after her MRI confirmed that a stroke was not the cause of her symptoms and the abnormal fining in the right frank is felt to be extremely unlikely to be a stroke.       Date: 9/4   Day 2: Pt offers no new complaints today. Still with persistent R sided weakness. Also still with visual complaints. Tells me when she looks at something close to read (like a word search) she cannot focus on the words/letters. Weakness RUE and RLE. Ophthalm consult -- Impression: Amaurosis fugax. Unremarkable eye exam other than V-pattern exotropia which is most likely incidental.  The pt did mention that she owns chickens, goats and horses. Consideration of zoonoses could be added to the work-up with an infectious disease consultation. 9/5 -- Neuro note - Today pt reports that her visual symptoms have resolved and admits to being back at her baseline in regards to her vision. Pt states that she continues to have right upper and lower extremity weakness, which has improved since presentation.    Plan: No need for LP per discussion with attending neurologist.  - Pending:   ? Hemoglobin A1c, SHALINI, ANCA, RF screen, Sjogren's antibodies, anti-DNA antibody, methylmalonic acid, SPEP, RPR, Lyme total AB with reflex, vitamin B6, vitamin B1, heavy metal screen, folate, ACE, heavy metals profile II  - Continue B12 supplementation  - Continue vitamin D supplementation  - Lovenox DVT prophylaxis held at this time with plan for potential LP  - PT/OT  - Medical management supportive care per primary team, notify with changes  - No further inpatient recommendations at this time, can follow-up in the outpatient setting      Wt Readings from Last 1 Encounters:   09/04/23 108 kg (237 lb)     Additional Vital Signs:   Date/Time Temp Pulse Resp BP MAP (mmHg) SpO2 O2 Device Patient Position - Orthostatic VS   09/04/23 14:36:28 99 °F (37.2 °C) 78 16 111/66 81 97 % -- --   09/04/23 1330 -- 85 -- 110/65 -- -- -- --   09/04/23 08:55:29 -- 84 -- 110/65 80 97 % -- --   09/04/23 07:35:30 97.8 °F (36.6 °C) 86 16 104/55  78 96 % None (Room air) Lying   BP: nurse notified at 09/04/23 0735   09/04/23 0438 -- -- -- 94/57 -- -- -- --   09/03/23 2300 -- -- -- -- -- -- None (Room air) --   09/03/23 22:47:24 97.9 °F (36.6 °C) 85 18 -- -- 95 % -- --       Pertinent Labs/Diagnostic Test Results:   MRI brain and orbits 9/3:   Tiny focus of abnormal signal within the right paramedian frank which is faintly hyperintense on T2/FLAIR imaging, demonstrates subtle susceptibility on Greenup imaging and punctate enhancement after administration of contrast on multiple sequences. Findings are nonspecific for a patient of this age and differential considerations would include a small slow flow vascular abnormality such as cavernous angioma or hereditary telangiectasia. A small subacute infarct is considered less likely given the lack of diffusion abnormality. More aggressive etiologies such as parenchymal metastasis also considered less likely given patient's age. Recommend short-term follow-up examination in approximately 1-2 months to assess change over time. MRI c-spine  9/5:   Unremarkable MRI of the cervical spine.    See recent MRI brain for discussion of stable tiny enhancing lesion in the frank      Results from last 7 days   Lab Units 09/01/23  1823   SARS-COV-2  Negative     Results from last 7 days   Lab Units 09/02/23 0457 09/01/23  1821   WBC Thousand/uL 6.49 9.01   HEMOGLOBIN g/dL 12.5 15.1   HEMATOCRIT % 40.0 47.7*   PLATELETS Thousands/uL 193 234   NEUTROS ABS Thousands/µL 4.14  --      Results from last 7 days   Lab Units 09/02/23 0457 09/01/23  1821   SODIUM mmol/L 138 137   POTASSIUM mmol/L 3.8 3.7   CHLORIDE mmol/L 107 103   CO2 mmol/L 26 27   ANION GAP mmol/L 5 7   BUN mg/dL 9 8   CREATININE mg/dL 0.67 0.63   EGFR ml/min/1.73sq m 117 119   CALCIUM mg/dL 8.6 9.6   MAGNESIUM mg/dL 2.1  --      Results from last 7 days   Lab Units 09/02/23  0457   AST U/L 26   ALT U/L 25   ALK PHOS U/L 67   TOTAL PROTEIN g/dL 6.2*   ALBUMIN g/dL 3.7   TOTAL BILIRUBIN mg/dL 0.53     Results from last 7 days   Lab Units 09/01/23  1822   POC GLUCOSE mg/dl 107     Results from last 7 days   Lab Units 09/02/23  0457 09/01/23  1821   GLUCOSE RANDOM mg/dL 95 96     Results from last 7 days   Lab Units 09/03/23  1917   CK TOTAL U/L 129     Results from last 7 days   Lab Units 09/01/23 2022 09/01/23  1821   HS TNI 0HR ng/L  --  3   HS TNI 2HR ng/L 3  --    HSTNI D2 ng/L 0  --        Results from last 7 days   Lab Units 09/01/23  1821   PROTIME seconds 12.2   INR  0.88   PTT seconds 27     Results from last 7 days   Lab Units 09/01/23  1821   TSH 3RD GENERATON uIU/mL 2.065     Results from last 7 days   Lab Units 09/03/23 1917 09/01/23  1821   CRP mg/L 3.8* 6.6*   SED RATE mm/hour 16  --      Results from last 7 days   Lab Units 09/01/23  4280   INFLUENZA A PCR  Negative   INFLUENZA B PCR  Negative   RSV PCR  Negative       Past Medical History:   Diagnosis Date   • Concussion • Concussion      Present on Admission:  • Acute right-sided weakness  • Acute loss of vision, bilateral      Admitting Diagnosis: Stroke-like symptoms  Age/Sex: 34 y.o. female  Admission Orders:  Scheduled Medications:  atorvastatin, 40 mg, Oral, Daily With Dinner  cholecalciferol, 2,000 Units, Oral, Daily  cyanocobalamin, 1,000 mcg, Intramuscular, Daily   Followed by  Michael Rollins ON 9/7/2023] vitamin B-12, 1,000 mcg, Oral, Daily  enoxaparin, 40 mg, Subcutaneous, Daily  [START ON 9/10/2023] ergocalciferol, 50,000 Units, Oral, Weekly  multivitamin-minerals, 1 tablet, Oral, Daily    PRN Meds:  acetaminophen, 650 mg, Oral, Q6H PRN  LORazepam, 0.5 mg, Intravenous, Once PRN        IP CONSULT TO NUTRITION SERVICES  IP CONSULT TO NEUROLOGY  IP CONSULT TO OPHTHALMOLOGY    Network Utilization Review Department  ATTENTION: Please call with any questions or concerns to [phone] and carefully listen to the prompts so that you are directed to the right person. All voicemails are confidential.  Benji Teixeira all requests for admission clinical reviews, approved or denied determinations and any other requests to dedicated fax number below belonging to the campus where the patient is receiving treatment.  List of dedicated fax numbers for the Facilities:  [place] DENIALS (Administrative/Medical Necessity) [phone] [address] [facility] (Maternity/NICU/Pediatrics) [phone]   [address] [place] [address] Drive [phone]   [facility] [address] [facility] [phone]   [facility] [address]   [id number] [place] [address] Drive [phone]   [address] [place] [address]  [facility] [phone]

## 2023-09-04 NOTE — PROGRESS NOTES
[address] [place] [address]  Progress Note  Name: Adelia Armando I  MRN: [id number]  [facility] Date of Admission: 9/3/2023   Date of Service: 9/4/2023 I Hospital Day: 1    Assessment/Plan   * Acute right-sided weakness  Assessment & Plan  Initially presented to [address] [place] [address] with acute onset bilateral blurred vision beginning at 11 AM while looking at computer while at work, then at 3 PM developed nose numbness and metallic taste in mouth followed by right-sided facial heaviness and right upper extremity weakness. Right upper extremity weakness persist at time of admission, then noted to develop right lower extremity weakness in addition to ongoing visual complaints. Neurology recommended transfer to [facility] for further workup/evaluation. • NIH 1- No TNK per neurology  • CT/CTA brain without ischemia, infarct, bleed, lesion or LVO  · MRI brain: Tiny focus of abnormal signal within the right paramedian frank. ..nonspecific for a patient of this age and differential considerations would include a small slow flow vascular abnormality such as cavernous angioma or hereditary telangiectasia. A small subacute infarct is considered less likely given the lack of diffusion abnormality. More aggressive etiologies such as parenchymal metastasis also considered less likely given patient's age.    • Lipid panel with ; A1c pending   • Vit D low at 15.4, started on oral supplementation   • Vit B12 low-normal at 222, ordered IM and PO supplementation per neurology; follow-up B6 and B1 levels  • Appreciate ongoing neurology recommendations   o Aspirin stopped after MRI confirmed that a stroke was not the cause of her symptoms and the abnormal fining in the right frank is felt to be extremely unlikely to be a stroke.  o MRI cervical spine ordered and pending, although would not account for visual symptoms   o Echocardiogram with bubble study ordered and pending   o ESR normal, CRP mildly elevated, CK normal   o Pending labs: Methylmalonic acid, homocystine, folate, thiamine, Lyme, RPR, SHALINI with reflex testing, ANCA panel, Sjogren's panel, RF, dsDNA, SPEP, heavy metal screen  o Ophthalmology consult for visual symptoms, see plan below   • Telemetry monitoring prior to transfer did not reveal any tachy or bradycardia arrhythmias  • Speech/PT/OT evaluations appreciated     Acute loss of vision, bilateral  Assessment & Plan  Acute onset bilateral blurred vision as outlined above. Persisting now, particularly when trying to read patient states she cannot focus on letters/words   · Appreciate ophthalmology consult   · Remainder of plan as outlined above     Hyperlipidemia  Assessment & Plan  Lipid panel: cholesterol 182, ,   · Resume Lipitor 40 mg daily          VTE Pharmacologic Prophylaxis:   Moderate Risk (Score 3-4) - Pharmacological DVT Prophylaxis Ordered: enoxaparin (Lovenox). Patient Centered Rounds: I performed bedside rounds with nursing staff today. Discussions with Specialists or Other Care Team Provider: will follow-up neurology recommendations, notified ophthalmology of consult     Education and Discussions with Family / Patient: Patient declined call to . Total Time Spent on Date of Encounter in care of patient: 35 minutes This time was spent on one or more of the following: performing physical exam; counseling and coordination of care; obtaining or reviewing history; documenting in the medical record; reviewing/ordering tests, medications or procedures; communicating with other healthcare professionals and discussing with patient's family/caregivers.     Current Length of Stay: 1 day(s)  Current Patient Status: Inpatient   Certification Statement: The patient will continue to require additional inpatient hospital stay due to neurology evaluation, additional workup outlined above  Discharge Plan: Anticipate discharge in 48-72 hrs to discharge location to be determined pending rehab evaluations. Code Status: Level 1 - Full Code    Subjective:   Patient offers no new complaints today. Still with persistent R sided weakness. Also still with visual complaints. Tells me when she looks at something close to read (like a word search) she cannot focus on the words/letters. Objective:     Vitals:   Temp (24hrs), Av.7 °F (36.5 °C), Min:97.5 °F (36.4 °C), Max:97.9 °F (36.6 °C)    Temp:  [97.5 °F (36.4 °C)-97.9 °F (36.6 °C)] 97.8 °F (36.6 °C)  HR:  [84-98] 84  Resp:  [16-18] 16  BP: ()/(55-76) 110/65  SpO2:  [95 %-97 %] 97 %  Body mass index is 38.39 kg/m². Input and Output Summary (last 24 hours): Intake/Output Summary (Last 24 hours) at 2023 1226  Last data filed at 2023 0900  Gross per 24 hour   Intake 120 ml   Output 400 ml   Net -280 ml       Physical Exam:   Physical Exam  Vitals and nursing note reviewed. Constitutional:       General: She is not in acute distress. Appearance: She is obese. Cardiovascular:      Rate and Rhythm: Normal rate and regular rhythm. Pulmonary:      Effort: Pulmonary effort is normal. No respiratory distress. Skin:     General: Skin is warm and dry. Coloration: Skin is not pale. Findings: No erythema. Neurological:      Mental Status: She is alert and oriented to person, place, and time. Mental status is at baseline. Motor: Weakness (RUE RLE) present.          Additional Data:     Labs:  Results from last 7 days   Lab Units 23  0457   WBC Thousand/uL 6.49   HEMOGLOBIN g/dL 12.5   HEMATOCRIT % 40.0   PLATELETS Thousands/uL 193   NEUTROS PCT % 65   LYMPHS PCT % 27   MONOS PCT % 5   EOS PCT % 3     Results from last 7 days   Lab Units 23  0457   SODIUM mmol/L 138   POTASSIUM mmol/L 3.8   CHLORIDE mmol/L 107   CO2 mmol/L 26   BUN mg/dL 9   CREATININE mg/dL 0.67   ANION GAP mmol/L 5   CALCIUM mg/dL 8.6   ALBUMIN g/dL 3.7   TOTAL BILIRUBIN mg/dL 0.53   ALK PHOS U/L 67   ALT U/L 25 AST U/L 26   GLUCOSE RANDOM mg/dL 95     Results from last 7 days   Lab Units 09/01/23  1821   INR  0.88     Results from last 7 days   Lab Units 09/01/23  1822   POC GLUCOSE mg/dl 107               Lines/Drains:  Invasive Devices     Peripheral Intravenous Line  Duration           Peripheral IV 09/01/23 Left Antecubital 2 days                      Imaging: Reviewed radiology reports from this admission including: MRI brain    Recent Cultures (last 7 days):         Last 24 Hours Medication List:   Current Facility-Administered Medications   Medication Dose Route Frequency Provider Last Rate   • acetaminophen  650 mg Oral Q6H PRN Irlanda Wilhelm MD     • cholecalciferol  2,000 Units Oral Daily Roxanna Leggett PA-C     • cyanocobalamin  1,000 mcg Intramuscular Daily Irene Cosme MD      Followed by   • [START ON 9/7/2023] vitamin B-12  1,000 mcg Oral Daily Irene Cosme MD     • enoxaparin  40 mg Subcutaneous Daily Irlanda Wilhelm MD     • [START ON 9/10/2023] ergocalciferol  50,000 Units Oral Weekly Irlanda Wilhelm MD     • LORazepam  0.5 mg Intravenous Once PRN Gerry Leggett PA-C     • multivitamin-minerals  1 tablet Oral Daily Irene Cosme MD          Today, Patient Was Seen By: Kaleb Colin PA-C    **Please Note: This note may have been constructed using a voice recognition system. **

## 2023-09-04 NOTE — ASSESSMENT & PLAN NOTE
• POA beginning at 11 AM developed bilateral blurred vision while looking at computer while at work then at 3 PM developed nose numbness, metallic taste in mouth followed by right-sided facial heaviness and right upper extremity weakness. Right upper extremity weakness and blurred vision persist at time of admission. Patient transferred to [facility] and currently the visual disturbance as well as cranial manifestations are resolved. However, patient still continues to have relative weakness of the right arm and right leg. According to patient, physical therapy states that she needed to focus on lifting and moving her right leg during ambulation. • [facility] in [address] [place] [address]. • NIH 1- No TNK per neurology  • CT/CTA brain without ischemia, infarct, bleed, lesion or LVO  · MRI summarized as follows: Tiny focus of abnormal signal within the right paramedian frank. ..nonspecific for a patient of this age and differential considerations would include a small slow flow vascular abnormality such as cavernous angioma or hereditary telangiectasia. A small subacute infarct is considered less likely given the lack of diffusion abnormality. More aggressive etiologies such as parenchymal metastasis also considered less likely given patient's age. Recommend short-term follow-up examination in approximately 1-2 months to assess change over time. • Aspirin 325 mg loaded in ED then 81 mg daily   • Atorvastatin 40 mg daily  • Lipid panel reviewed. • Telemetry monitoring did not reveal any tacky or bradycardia arrhythmias. • -200 permissive hypertension  • Follow-up on echocardiogram with bubble study (still to be done in order to in [id number])  • Flp, tsh, hba1c, b12, tsh, vit D noted. Vitamin D supplementation started. • speech/PT/OT eval, case management  • Neurology formal consult to continue.   • Transferred to [facility] for further studies: Cerebrospinal fluid LP and MRI of cervical spines.

## 2023-09-04 NOTE — PLAN OF CARE
Problem: PHYSICAL THERAPY ADULT  Goal: Performs mobility at highest level of function for planned discharge setting. See evaluation for individualized goals. Description: Treatment/Interventions: ADL retraining, Functional transfer training, LE strengthening/ROM, Elevations, Therapeutic exercise, Endurance training, Patient/family training, Cognitive reorientation, Equipment eval/education, Bed mobility, Gait training, Compensatory technique education, Continued evaluation, Spoke to MD, Spoke to nursing, Spoke to case management  Equipment Recommended:  (TBD w/ progress closer to d/c- RW for now)       See flowsheet documentation for full assessment, interventions and recommendations. Note: Prognosis: Good  Problem List: Decreased strength, Decreased endurance, Impaired balance, Decreased mobility, Decreased coordination, Decreased cognition, Impaired judgement, Decreased safety awareness, Impaired hearing, Impaired sensation  Pt is 34 y.o. female seen for PT evaluation s/p admit to [address] [place] [address] on 9/3/2023. Pt presenting as a transfers from [address] [place] [address] on stroke pathway. Pt initially presenting w/ blurry vision and R UE/ LE weakness "heaviness" . MRI brain and orbits w/wo: "Tiny focus of abnormal signal within the right paramedian frank which is faintly hyperintense on T2/FLAIR imaging, demonstrates subtle susceptibility on [place] imaging and punctate enhancement after administration of contrast on multiple sequences."  Report indicates that it is most likely a small vascular abnormality, but etiology uncertain, and differential includes a tiny subacute infarct with hemorrhagic conversion but there is no abnormality on perfusion imaging, and metastatic disease is unlikely given her age. Pt was xferred to [facility] for higher level of care + for in person neuro and opthmology evals  Current dx/ problem list includes: stroke like symptoms- workup ongoing.  PT consulted for mobility and to assist w/ d/c planning recommendations. Comorbidities affecting pt's physical performance at time of assessment listed above  Due to acute medical issues, ongoing medical workup for primary dx; pain, fall risk, increased reliance on more restrictive AD compared to baseline;  decreased activity tolerance compared to baseline, increased assistance needed from caregiver at current time, continuous telemetry/o2 monitoring, new onset R deficits + visual changes;  multiple lines, decline in overall functional mobility status; health management issues; note unstable clinical picture (high complexity). At baseline pt reports being fully indep and not using AD; living w/ spouse and 3 small kids in a [address] driving and working FT;  Currently pt  is able to demonstrate S A for bed skills; S for functional transfers and  Close S for ambulation w/ RW w/ noteable R LE fatigue + foot drag w/ gait especially pronounced w/ increased fatigue > 75'x2; trials on eval w/ and w/o RW- pt w/ improved safety + confidence w/ use of RW and RW recommended for safety at this time. Pt requiring min/ CGA + cues for clearance of R LE w/o AD. Pt appropriately concerned re her R UE and LE weakness. Pt presents functioning below baseline and currently w/ overall mobility deficits 2* to: decreased RLE strength/AROM; R fine and groos motor coordination deficits;  limited flexibility;  generalized weakness/ deconditioning; decreased endurance; decreased activity tolerance; decreased coordination ( R hemibody) ; impaired balance; gait deviations;  fatigue;  visual impairments;(Please find additional objective findings from PT assessment regarding body systems outlined above.) Pt will continue to benefit from skilled PT interventions to address stated impairments; to maximize functional potential; for ongoing pt/ family training; and DME needs. PT is currently recommending d/c home w/ family support and OPPT when medically cleared.  RW recommended for now for safety- will cont to assess for appropriate AD upon d/c w/ progress . Pt/ family agreeable to plan and goals as stated on evaluation. PT Discharge Recommendation: Home with outpatient rehabilitation    See flowsheet documentation for full assessment.

## 2023-09-04 NOTE — OCCUPATIONAL THERAPY NOTE
Occupational Therapy Evaluation      Ricardo Wu    9/4/2023    Principal Problem:    Acute right-sided weakness  Active Problems:    Acute loss of vision, bilateral    Hyperlipidemia      Past Medical History:   Diagnosis Date    Concussion     Concussion        Past Surgical History:   Procedure Laterality Date    KNEE ARTHROSCOPY          09/04/23 1058   OT Last Visit   OT Visit Date 09/04/23   Note Type   Note type Evaluation   Pain Assessment   Pain Assessment Tool 0-10   Pain Score No Pain   Restrictions/Precautions   Weight Bearing Precautions Per Order No   Other Precautions Fall Risk   Home Living   Type of [facility]  Multi-level;Bed/bath upstairs;Stairs to enter with rails  (bi-level; 4STE thru front vs 2STE thru garage; 4STE up/down)   Bathroom Shower/Tub Tub/shower unit   Bathroom Toilet Standard   Bathroom Equipment   (denies DME)   [address]   (denies DME)   Prior Function   Level of [place] Independent with ADLs; Independent with functional mobility; Independent with IADLS   Lives With Spouse; Son   Cathleen Guerra Help From Family   IADLs Independent with driving; Independent with meal prep; Independent with medication management   Falls in the last 6 months 0   Vocational Full time employment   Lifestyle   Autonomy Pt reports being IND w/ all ADLS and IADLS; (+) drives; ambulates w/o AD PTA   Reciprocal Relationships Pt lives w/ her son and 1 young sons aged 3, 6, and 6.    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 Pt seated OOB in chair w/ all needs in reach s/p OT session   Transfers   Sit to Stand 5  Supervision   Stand to Sit 5  Supervision   Additional Comments Transfers w/ RW   Functional Mobility   Functional Mobility 5  Supervision   Additional Comments Pt demonstrated long distance household mobility in hallway w/ RW at S level   Additional items Rolling walker   Balance   Static Sitting Good   Dynamic Sitting Fair +   Static Standing Fair +   Dynamic Standing Fair   Ambulatory Fair   Activity Tolerance   Activity Tolerance Patient tolerated treatment well   Medical Staff Made Aware PT Nasario Dakin; Pt seen as a co-session due to the patient's co-morbidities, clinically unstable presentation, and present impairments which are a regression from the patient's baseline. Nurse Made Aware RN cleared Pt for OT session   RUE Assessment   RUE Assessment X  (decreased gross ROM and strength)   LUE Assessment   LUE Assessment WFL   Hand Function   Gross Motor Coordination Impaired  (R>L)   Fine Motor Coordination Impaired  (R>L)   Sensation   Light Touch No apparent deficits   Vision-Basic Assessment   Patient Visual Report Blurring of print when reading   Vision - Complex Assessment   Ocular Range of Motion Intact   Tracking Intact   Perception   Inattention/Neglect Appears intact   Cognition   Overall Cognitive Status WFL   Arousal/Participation Alert   Attention Within functional limits   Orientation Level Oriented X4   Memory Within functional limits   Following Commands Follows all commands and directions without difficulty   Comments Pt is pleasant and cooperative for therapy today   Assessment   Limitation Decreased ADL status; Decreased endurance;Decreased high-level ADLs; Decreased UE strength   Prognosis Good   Assessment Pt is a 33 yo female seen for OT eval s/p adm to [facility] on 9/3/23 w/ RUE/RLE weakness, facial numbness, blurred vision dx'd w/ acute R sided weakness. CT brain negative. Per neurology, MRI confirmed stroke was not the cause. Abnormal finding in the R frank. MRI cervical spine ordered and pending. Further workup pending. Pt  has a past medical history of Concussion and Concussion. Pt with active OT orders and activity as tolerated orders. Pt works FT and resides w/ her  and 3 young kids in bi-level home. Pt was I w/ ADLS and IADLS, drove, & required no use of DME PTA. Pt is currently demonstrating the following occupational deficits: S- Min A self care, S functional transfers and mobility w/ RW. These deficits that are impacting pt's baseline areas of occupation are a result of the following impairments: endurance, activity tolerance, functional mobility, balance, decreased I w/ ADLS/IADLS, strength, ROM, impaired fine motor skills and coordination deficits. The following Occupational Performance Areas to address include: grooming, bathing/shower, toilet hygiene, dressing, health maintenance, functional mobility and clothing management. Based on the aforementioned OT evaluation, functional performance deficits, and assessments, pt has been identified as a high complexity evaluation. Recommend home with family support and outpatient OT for RUE upon D/C. The patient's raw score on the AM-PAC Daily Activity Inpatient Short Form is 20. A raw score of greater than or equal to 19 suggests the patient may benefit from discharge to home. Please refer to the recommendation of the Occupational Therapist for safe discharge planning. Pt to continue to benefit from acute immediate OT services to address the following goals 2-3x/week to  w/in 10-14 days:   Goals   Patient Goals To return home soon   LTG Time Frame 7-10   Long Term Goal #1 Refer to goals below   Plan   Treatment Interventions ADL retraining;Functional transfer training;UE strengthening/ROM; Endurance training;Neuromuscular reeducation; Fine motor coordination activities; Compensatory technique education; Energy conservation; Activityengagement   Goal Expiration Date 09/14/23   OT Frequency 1-2x/wk   Recommendation   OT Discharge Recommendation Home with outpatient rehabilitation  (outpt OT for [facility])   AM-PAC Daily Activity Inpatient   Lower Body Dressing 3   Bathing 3   Toileting 3   Upper Body Dressing 4   Grooming 4   Eating 3   Daily Activity Raw Score 20   Daily Activity Standardized Score (Calc for Raw Score >=11) 42.03   AM-PAC Applied Cognition Inpatient   Following a Speech/Presentation 4   Understanding Ordinary Conversation 4   Taking Medications 4   Remembering Where Things Are Placed or Put Away 4   Remembering List of 4-5 Errands 4   Taking Care of Complicated Tasks 4   Applied Cognition Raw Score 24   Applied Cognition Standardized Score 62.21     GOALS    1) Pt will imrpove activity tolerance to G for min 30 min txment sessions  2) Pt will complete ADLs/self care w/ mod I  3) Pt will complete toileting w/ mod I w/ G hygiene/thoroughness using DME PRN  4) Pt will improve fx'l tfers on/off all surfaces using dME PRN w/ G balance/safety including toileting w/ mod I  5) Pt will improve fx'l mobility during I/ADl/leisure tasks using DME PRN w/ g balance/safety w/ mod I  6) Pt will engage in ongoing cognitive assessment w/ G participation w/ mod I to A w/ safe d/c planning/recommendations  7) Pt will demonstrate G carryover of pt/caregiver education and training as appropriate w/ mod I w/o cues w/ G tolerance  8) Pt will engage in depression screen/leisure interest checklist w/ mod I and G participation to monitor s/s depression and ID 3 positive coping strategies to A w/ emotional regulation and management  9) Pt will improve UB fx'l use/ROM to [facility] and strength 1/2 MMT via AROM/AAROM/PROM in all planes as tolerated s/p skilled education of HEP w/ mod I w/o cues for carryover  11) Pt will engage in ongoing  assessments, screens, and activities t/o fx'l I/ADL/leisure tasks w/ G participation and mod I to A w/ adaptation and accomodations or rule out visual perceptual impairments        Randy Tay MS, OTR/L

## 2023-09-04 NOTE — H&P
[address] [facility]  H&P  Name: Tashi Flores 34 y.o. female I MRN: [id number]  [facility] Date of Admission: 9/3/2023   Date of Service: 9/3/2023 I Hospital Day: 0      Assessment/Plan   Stroke-like symptoms  Assessment & Plan  • POA beginning at 11 AM developed bilateral blurred vision while looking at computer while at work then at 3 PM developed nose numbness, metallic taste in mouth followed by right-sided facial heaviness and right upper extremity weakness. Right upper extremity weakness and blurred vision persist at time of admission. Patient transferred to [facility] and currently the visual disturbance as well as cranial manifestations are resolved. However, patient still continues to have relative weakness of the right arm and right leg. According to patient, physical therapy states that she needed to focus on lifting and moving her right leg during ambulation. • Patient was [facility] in [address] [place] [address]. • NIH 1- No TNK per neurology  • CT/CTA brain without ischemia, infarct, bleed, lesion or LVO  · MRI summarized as follows: Tiny focus of abnormal signal within the right paramedian frank. ..nonspecific for a patient of this age and differential considerations would include a small slow flow vascular abnormality such as cavernous angioma or hereditary telangiectasia. A small subacute infarct is considered less likely given the lack of diffusion abnormality. More aggressive etiologies such as parenchymal metastasis also considered less likely given patient's age. Recommend short-term follow-up examination in approximately 1-2 months to assess change over time. • Aspirin 325 mg loaded in ED then 81 mg daily   • Atorvastatin 40 mg daily  • Lipid panel reviewed. • Telemetry monitoring did not reveal any tacky or bradycardia arrhythmias.   • -200 permissive hypertension  • Follow-up on echocardiogram with bubble study (still to be done in order to in [facility])  • Flp, tsh, hba1c, b12, tsh, vit D noted. Vitamin D supplementation started. • speech/PT/OT eval, case management  • Neurology formal consult to continue. • Transferred to [address] [place] [address] for further studies: Cerebrospinal fluid LP and MRI of cervical spines. VTE Prophylaxis: Enoxaparin (Lovenox)  / sequential compression device   Code Status: Level 1 - Full Code as discussed with patient  POLST: There is no POLST form on file for this patient (pre-hospital)    Anticipated Length of Stay:  Patient will be admitted on an Inpatient basis with an anticipated length of stay of greater than 2 midnights. Justification for Hospital Stay: Please see detailed plans noted above. Chief Complaint:     Transferred to [address] [place] [address] from [address] [place] [address] due to right-sided weakness with metallic taste of mouth and visual disturbance. History of Present Illness:  Mary Bro is a 34 y.o. female who has no significant past medical history however patient prior to admission was complaining of blurring of vision which she thought was fatigue from using the computer. The summary of events are as follows:  September 1, 11 a.m.: Patient was complaining of blurring of vision which she thought was secondary to visual fatigue from looking at the computer screen for too long. 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 September 1, 3 PM patient was complaining of right-sided somatic weakness of both arms and legs complaining that they were somewhat heavy but there was no numbness nor tingling. Patient does not report any tingling tips of the hands or feet. Patient denies any previous history of cardiac problems, no recent viral infections or upper respiratory tract infections or symptoms. On initial presentation the visual acuity was noted to be 20/50 on the right and 20/70 on the left.   She also has weakness of the right arm with decreased  and drifting. Blood pressure was 141/97. Rest of the laboratories were normal however CRP mildly elevated at 6.6. IV TNK was not given as it was outside window of administration and they think that symptoms are not due to stroke. She was then admitted for additional work-up. She was started on aspirin 325 mg then continuing at 81 mg. She was also placed on Lipitor 40 mg daily. MRI was then ordered along with echocardiogram.  Echo is still pending while the MRI results are noted above. Patient was then transferred to [facility] as per neurology for formal face-to-face neurologic evaluation and the need for cervical MRI as well as lumbar puncture/cerebrospinal fluid studies. Currently, patient is lying flat on bed and does not appear toxic. She says that she feels much better as far as the visual disturbance is concerned. She also denies having any visual field cuts. However she continues to have drifting of the right arm with a decreased . She is also able to lift both lower extremities however much better on the left rather than the right.     Review of Systems:    Constitutional:  Denies fever or chills   Eyes: Visual acuity change and blurring as above  HENT:  Denies nasal congestion or sore throat   Respiratory:  Denies cough or shortness of breath   Cardiovascular:  Denies chest pain or edema   GI:  Denies abdominal pain, nausea, vomiting, bloody stools or diarrhea   :  Denies dysuria   Musculoskeletal:  Denies back pain or joint pain   Integument:  Denies rash   Neurologic:  Denies headache, denies sensory changes however has weakness of the right arm and leg as noted  Endocrine:  Denies polyuria or polydipsia   Psychiatric:  Denies depression or anxiety     Past Medical and Surgical History:   Past Medical History:   Diagnosis Date   • Concussion      Past Surgical History:   Procedure Laterality Date   • KNEE ARTHROSCOPY Meds/Allergies:  No medications prior to admission. 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  Patient Pre-hospital Living Situation: Lives at home  Patient Pre-hospital Level of Mobility: Ambulatory  Patient Pre-hospital Diet Restrictions: Regular  Substance Use History:   Social History     Substance and Sexual Activity   Alcohol Use Not Currently     Social History     Tobacco Use   Smoking Status Never   Smokeless Tobacco Never     Social History     Substance and Sexual Activity   Drug Use Never       Family History:  Family History   Adopted: Yes       Physical Exam:     Vitals:   Pulse: 85 (09/03/23 2247)  Temperature: 97.9 °F (36.6 °C) (09/03/23 2247)  Respirations: 18 (09/03/23 2247)  SpO2: 95 % (09/03/23 2247)    Constitutional:  Well developed, well nourished, no acute distress, non-toxic appearance   Eyes:  PERRL, conjunctiva normal   HENT:  Atraumatic, external ears normal, nose normal, oropharynx moist, no pharyngeal exudates. Neck- normal range of motion, no tenderness, supple   Respiratory:  No respiratory distress, normal breath sounds, no rales, no wheezing   Cardiovascular:  Normal rate, normal rhythm, no murmurs, no gallops, no rubs   GI:  Soft, nondistended, normal bowel sounds, nontender, no organomegaly, no mass, no rebound, no guarding   :  No costovertebral angle tenderness   Musculoskeletal:  No edema, no tenderness, no deformities. Back- no tenderness  Integument:  Well hydrated, no rash   Lymphatic:  No lymphadenopathy noted   Neurologic:  Alert &awake, communicative, CN 2-12 normal, without nystagmus, tongue midline, no dysphonia or dysphagia. Can shoulder shrug. Noted drifting of the right upper extremity. Decreased  of the right upper extremity.   Patient cannot extended left the right lower extremity compared to left. Psychiatric:  Speech and behavior appropriate       Lab Results: I have personally reviewed pertinent reports. Results from last 7 days   Lab Units 09/02/23  0457   WBC Thousand/uL 6.49   HEMOGLOBIN g/dL 12.5   HEMATOCRIT % 40.0   PLATELETS Thousands/uL 193   NEUTROS PCT % 65   LYMPHS PCT % 27   MONOS PCT % 5   EOS PCT % 3     Results from last 7 days   Lab Units 09/02/23  0457   POTASSIUM mmol/L 3.8   CHLORIDE mmol/L 107   CO2 mmol/L 26   BUN mg/dL 9   CREATININE mg/dL 0.67   CALCIUM mg/dL 8.6   ALK PHOS U/L 67   ALT U/L 25   AST U/L 26     Results from last 7 days   Lab Units 09/01/23  1821   INR  0.88           Imaging: I have personally reviewed pertinent reports. MRI brain and orbits wo and w contrast    Result Date: 9/3/2023  Narrative: MRI OF THE BRAIN AND ORBITS - WITH AND WITHOUT CONTRAST INDICATION: vision loss. COMPARISON:  None. TECHNIQUE: Multiplanar, multisequence imaging of the brain and orbits was performed before and after gadolinium administration. IV Contrast:  10 mL of Gadobutrol injection (SINGLE-DOSE) IMAGE QUALITY:  Diagnostic. FINDINGS: BRAIN PARENCHYMA: There is a tiny focus of hyperintense signal on T2 and FLAIR imaging within the right paramedian frank which demonstrates minimal associated susceptibility on [place] imaging and punctate enhancement after administration of contrast, see image 8 of axial T2/FLAIR and T1 postcontrast imaging. Diffusion imaging is unremarkable with no signs of acute ischemia. Incidentally noted small developmental venous anomaly within the left frontal lobe anteriorly and laterally. There are no white matter lesions identified within the supratentorial brain parenchyma. Postcontrast imaging of the remainder of the brain parenchyma is unremarkable. ORBITS:  Normal globes. Normal ocular muscles. Optic nerves and chiasm are normal.  Normal cavernous sinuses.   Preseptal and retrobulbar soft tissues are normal. VENTRICLES: Normal for the patient's age. SELLA AND PITUITARY GLAND:  Normal PARANASAL SINUSES:  Normal. VASCULATURE:  Evaluation of the major intracranial vasculature demonstrates appropriate flow voids. CALVARIUM AND SKULL BASE:  Normal. EXTRACRANIAL SOFT TISSUES:  Normal.     Impression: Tiny focus of abnormal signal within the right paramedian frank which is faintly hyperintense on T2/FLAIR imaging, demonstrates subtle susceptibility on [place] imaging and punctate enhancement after administration of contrast on multiple sequences. See series 7 image 28 and series 14 image 62. Findings are nonspecific for a patient of this age and differential considerations would include a small slow flow vascular abnormality such as cavernous angioma or hereditary telangiectasia. A small subacute infarct is considered less likely given the lack of diffusion abnormality. More aggressive etiologies such as parenchymal metastasis also considered less likely given patient's age. Recommend short-term follow-up examination in approximately 1-2 months to assess change over time. This examination was marked "immediate notification" in Epic in order to begin the standard process by which the radiology reading room liaison alerts the referring practitioner. Workstation performed: [id number]     CTA stroke alert (head/neck)    Result Date: 9/1/2023  Narrative: CTA NECK AND BRAIN WITH CONTRAST INDICATION: Stroke Alert COMPARISON:   None. TECHNIQUE:   Post contrast imaging was performed after administration of iodinated contrast through the neck and brain. Post contrast axial 0.625 mm images timed to opacify the arterial system. 3D rendering was performed on an independent workstation. MIP reconstructions performed. Coronal reconstructions were performed of the noncontrast portion of the brain. Radiation dose length product (DLP) for this visit:  416 mGy-cm .   This examination, like all CT scans performed in the [facility], was performed utilizing techniques to minimize radiation dose exposure, including the use of iterative reconstruction and automated exposure control. IV Contrast: IMAGE QUALITY:   Diagnostic FINDINGS: CERVICAL VASCULATURE AORTIC ARCH AND GREAT VESSELS:  Normal aortic arch and great vessel origins. Normal visualized subclavian vessels. RIGHT VERTEBRAL ARTERY CERVICAL SEGMENT:  Normal origin. The vessel is normal in caliber throughout the neck. LEFT VERTEBRAL ARTERY CERVICAL SEGMENT:  Normal origin. The vessel is normal in caliber throughout the neck. RIGHT EXTRACRANIAL CAROTID SEGMENT:  Normal caliber common carotid artery. Normal bifurcation and cervical internal carotid artery. No stenosis or dissection. LEFT EXTRACRANIAL CAROTID SEGMENT:  Normal caliber common carotid artery. Normal bifurcation and cervical internal carotid artery. No stenosis or dissection. NASCET criteria was used to determine the degree of internal carotid artery diameter stenosis. INTRACRANIAL VASCULATURE INTERNAL CAROTID ARTERIES:  Normal enhancement of the intracranial portions of the internal carotid arteries. Normal ophthalmic artery origins. Normal ICA terminus. ANTERIOR CIRCULATION:  Symmetric A1 segments and anterior cerebral arteries with normal enhancement. Normal anterior communicating artery. MIDDLE CEREBRAL ARTERY CIRCULATION:  M1 segment and middle cerebral artery branches demonstrate normal enhancement bilaterally. DISTAL VERTEBRAL ARTERIES:  Normal distal vertebral arteries. Posterior inferior cerebellar artery origins are normal. Normal vertebral basilar junction. BASILAR ARTERY:  Basilar artery is normal in caliber. Normal superior cerebellar arteries. POSTERIOR CEREBRAL ARTERIES: Both posterior cerebral arteries arises from the basilar tip. Both arteries demonstrate normal enhancement. Normal posterior communicating arteries.  VENOUS STRUCTURES:  Normal. NON VASCULAR ANATOMY BONY STRUCTURES:  No acute osseous abnormality. SOFT TISSUES OF THE NECK:  Normal. THORACIC INLET:  Unremarkable. Impression: No carotid or vertebral artery stenosis No focal intracranial stenosis or aneurysm. Findings were directly communicated with Yoly Fortune via [facility] text at 6:32 p.m. on 9/1/2023. Workstation performed: [id number]     CT stroke alert brain    Result Date: 9/1/2023  Narrative: CT BRAIN - STROKE ALERT PROTOCOL INDICATION:   Stroke Alert. Blurry vision. Mildly right arm weakness. COMPARISON:  None. TECHNIQUE:  CT examination of the brain was performed. In addition to axial images, coronal reformatted images were created and submitted for interpretation. Radiation dose length product (DLP) for this visit:  803 mGy-cm . This examination, like all CT scans performed in the [facility], was performed utilizing techniques to minimize radiation dose exposure, including the use of iterative reconstruction and automated exposure control. IMAGE QUALITY:  Diagnostic. FINDINGS: PARENCHYMA:  No intracranial mass, mass effect or midline shift. No CT signs of acute infarction. No acute parenchymal hemorrhage. Normal intracranial vasculature. VENTRICLES AND EXTRA-AXIAL SPACES:  Normal for the patient's age. VISUALIZED ORBITS: Normal visualized orbits. PARANASAL SINUSES: Normal visualized paranasal sinuses. CALVARIUM AND EXTRACRANIAL SOFT TISSUES:   Normal.     Impression: No acute intracranial abnormality. Findings were directly discussed with Yoly Fortune at 6:15 p.m. on 9/1/2023. Workstation performed: [id number]         ** Please Note: Dragon 360 Dictation voice to text software was used in the creation of this document.  **

## 2023-09-04 NOTE — PLAN OF CARE
Problem: SAFETY ADULT  Goal: Patient will remain free of falls  Description: INTERVENTIONS:  - Educate patient/family on patient safety including physical limitations  - Instruct patient to call for assistance with activity   - Consult OT/PT to assist with strengthening/mobility   - Keep Call bell within reach  - Keep bed low and locked with side rails adjusted as appropriate  - Keep care items and personal belongings within reach  - Initiate and maintain comfort rounds  - Make Fall Risk Sign visible to staff  - Offer Toileting every 2Hours, in advance of need  - Initiate/Maintain tab alarm  - Obtain necessary fall risk management equipment: - Apply yellow socks and bracelet for high fall risk patients  - Consider moving patient to room near nurses station  Outcome: Progressing     Problem: DISCHARGE PLANNING  Goal: Discharge to home or other facility with appropriate resources  Description: INTERVENTIONS:  - Identify barriers to discharge w/patient and caregiver  - Arrange for needed discharge resources and transportation as appropriate  - Identify discharge learning needs (meds, wound care, etc.)  - Arrange for interpretive services to assist at discharge as needed  - Refer to Case Management Department for coordinating discharge planning if the patient needs post-hospital services based on physician/advanced practitioner order or complex needs related to functional status, cognitive ability, or social support system  Outcome: Progressing     Problem: Knowledge Deficit  Goal: Patient/family/caregiver demonstrates understanding of disease process, treatment plan, medications, and discharge instructions  Description: Complete learning assessment and assess knowledge base.   Interventions:  - Provide teaching at level of understanding  - Provide teaching via preferred learning methods  Outcome: Progressing     Problem: NEUROSENSORY - ADULT  Goal: Achieves stable or improved neurological status  Description: INTERVENTIONS  - Monitor and report changes in neurological status  - Monitor vital signs such as temperature, blood pressure, glucose, and any other labs ordered   - Initiate measures to prevent increased intracranial pressure  - Monitor for seizure activity and implement precautions if appropriate      Outcome: Progressing

## 2023-09-04 NOTE — PROGRESS NOTES
As per Dr. Zamzam Guerrero the stroke pathway does not have to be restarted. She is a transfer from [place] and had full stroke work up. Patient is to be Q 4 neuros.

## 2023-09-04 NOTE — ASSESSMENT & PLAN NOTE
MRI of the orbits with no acute abnormality.   Transferred to [facility] for [facility] evaluation

## 2023-09-04 NOTE — DISCHARGE SUMMARY
[address] [facility]  Discharge- Mac[place] Yue 1990, 34 y.o. female MRN: [id number]  Unit/Bed#: -01 Encounter: [id number]  Primary Care Provider: Debi Zambrano   Date and time admitted to hospital: 9/1/2023  6:02 PM    * Acute right-sided weakness  Assessment & Plan  • POA beginning at 11 AM developed bilateral blurred vision while looking at computer while at work then at 3 PM developed nose numbness, metallic taste in mouth followed by right-sided facial heaviness and right upper extremity weakness. Right upper extremity weakness and blurred vision persist at time of admission. • Patient was admitted to [facility]  • NIH 1- No TNK per neurology  • CT/CTA brain without ischemia, infarct, bleed, lesion or LVO  • 9/3 patient continues to have right upper extremity weakness. Unchanged from yesterday. Reports improvement in blurring of vision. Also has right lower extremity plantar and dorsiflexion weakness  • MRI of the brain was negative for acute stroke  • Patient had a televisit by neurology who recommended transfer to [facility] for in person neurology evaluation  ? Neurology also recommended further studies including MRI of the cervical spine, lumbar puncture   ? Echocardiogram with bubble study ordered and pending   ? ESR normal, CRP mildly elevated, CK normal   ? Pending labs: Methylmalonic acid, homocystine, folate, thiamine, Lyme, RPR, SHALINI with reflex testing, ANCA panel, Sjogren's panel, RF, dsDNA, SPEP, heavy metal screen  Transferred to [facility] for neurology and ophthalmology evaluation      Hyperlipidemia  Assessment & Plan  Continue statin therapy    Acute loss of vision, bilateral  Assessment & Plan  MRI of the orbits with no acute abnormality.   Transferred to [facility] for ophthalmology evaluation        Medical Problems     Resolved Problems  Date Reviewed: 9/4/2023   None       Discharging Physician / Practitioner: Edwige Flower MD  PCP: Debi Zambrano  Admission Date: Admission Orders (From admission, onward)     Ordered        09/03/23 1428  Inpatient Admission  Once            09/01/23 2005  Place in Observation  Once                      Discharge Date: 9/3/23  Consultations During Hospital Stay:  Neurology  Procedures Performed:   MRI of the brainTiny focus of abnormal signal within the right paramedian frank which is faintly hyperintense on T2/FLAIR imaging, demonstrates subtle susceptibility on [place] imaging and punctate enhancement after administration of contrast on multiple sequences. See   · series 7 image 28 and series 14 image 62. Findings are nonspecific for a patient of this age and differential considerations would include a small slow flow vascular abnormality such as cavernous angioma or hereditary telangiectasia. Significant Findings / Test Results:   · See above     Incidental Findings:   · See above     Test Results Pending at Discharge (will require follow up): · Echocardiogram  · Of the cervical spine       Reason for Admission: Right-sided weakness  Hospital Course:   Ayaka Garcia is a 34 y.o. female patient who originally presented to the hospital on 9/1/2023 due to right-sided weakness and perforation. It was a stroke alert. NIHSS admission was 1. Not a TNK candidate per neurology. Started on aspirin and statin therapy. Stroke work-up was initiated. Underwent MRI of the brain which was negative for acute stroke. Continues to have ongoing symptoms of weakness and blurred vision for which patient was transferred to [facility] for in person neurology evaluation and further work-up including MRI of the cervical spine possible LP, ophthalmology evaluation and further testing. Discharged in a stable condition. Please see above list of diagnoses and related plan for additional information.      Condition at Discharge: stable    Discharge Day Visit / Exam:   * Please refer to separate progress note for these details *    Discussion with Family: Updated  () at bedside. Discharge instructions/Information to patient and family:   See after visit summary for information provided to patient and family. Provisions for Follow-Up Care:  See after visit summary for information related to follow-up care and any pertinent home health orders. Disposition:   [address] Transfer to [facility]    Planned Readmission: No     Discharge Statement:  I spent 35minutes discharging the patient. This time was spent on the day of discharge. I had direct contact with the patient on the day of discharge. Greater than 50% of the total time was spent examining patient, answering all patient questions, arranging and discussing plan of care with patient as well as directly providing post-discharge instructions. Additional time then spent on discharge activities. Discharge Medications:  See after visit summary for reconciled discharge medications provided to patient and/or family.       **Please Note: This note may have been constructed using a voice recognition system**

## 2023-09-05 ENCOUNTER — APPOINTMENT (OUTPATIENT)
Dept: RADIOLOGY | Facility: HOSPITAL | Age: 33
End: 2023-09-05
Payer: COMMERCIAL

## 2023-09-05 VITALS
HEART RATE: 84 BPM | OXYGEN SATURATION: 96 % | WEIGHT: 237 LBS | BODY MASS INDEX: 38.09 KG/M2 | HEIGHT: 66 IN | RESPIRATION RATE: 16 BRPM | SYSTOLIC BLOOD PRESSURE: 111 MMHG | TEMPERATURE: 97.5 F | DIASTOLIC BLOOD PRESSURE: 68 MMHG

## 2023-09-05 LAB
ANA SER QL IA: NEGATIVE
ATRIAL RATE: 89 BPM
B BURGDOR IGG+IGM SER QL IA: NEGATIVE
EST. AVERAGE GLUCOSE BLD GHB EST-MCNC: 105 MG/DL
HBA1C MFR BLD: 5.3 %
P AXIS: 42 DEGREES
PR INTERVAL: 144 MS
QRS AXIS: -1 DEGREES
QRSD INTERVAL: 82 MS
QT INTERVAL: 354 MS
QTC INTERVAL: 430 MS
RHEUMATOID FACT SER QL LA: NEGATIVE
T WAVE AXIS: 42 DEGREES
TREPONEMA PALLIDUM IGG+IGM AB [PRESENCE] IN SERUM OR PLASMA BY IMMUNOASSAY: NORMAL
VENTRICULAR RATE: 89 BPM

## 2023-09-05 PROCEDURE — 99239 HOSP IP/OBS DSCHRG MGMT >30: CPT | Performed by: INTERNAL MEDICINE

## 2023-09-05 PROCEDURE — NC001 PR NO CHARGE: Performed by: STUDENT IN AN ORGANIZED HEALTH CARE EDUCATION/TRAINING PROGRAM

## 2023-09-05 PROCEDURE — 97116 GAIT TRAINING THERAPY: CPT

## 2023-09-05 PROCEDURE — 72156 MRI NECK SPINE W/O & W/DYE: CPT

## 2023-09-05 PROCEDURE — 99232 SBSQ HOSP IP/OBS MODERATE 35: CPT | Performed by: STUDENT IN AN ORGANIZED HEALTH CARE EDUCATION/TRAINING PROGRAM

## 2023-09-05 PROCEDURE — A9585 GADOBUTROL INJECTION: HCPCS | Performed by: INTERNAL MEDICINE

## 2023-09-05 RX ORDER — GADOBUTROL 604.72 MG/ML
10 INJECTION INTRAVENOUS
Status: COMPLETED | OUTPATIENT
Start: 2023-09-05 | End: 2023-09-05

## 2023-09-05 RX ORDER — MELATONIN
2000 DAILY
Qty: 60 TABLET | Refills: 0 | Status: SHIPPED | OUTPATIENT
Start: 2023-09-06

## 2023-09-05 RX ADMIN — LORAZEPAM 0.5 MG: 2 INJECTION INTRAMUSCULAR; INTRAVENOUS at 00:39

## 2023-09-05 RX ADMIN — Medication 2000 UNITS: at 09:31

## 2023-09-05 RX ADMIN — CYANOCOBALAMIN 1000 MCG: 1000 INJECTION, SOLUTION INTRAMUSCULAR at 09:31

## 2023-09-05 RX ADMIN — GADOBUTROL 10 ML: 604.72 INJECTION INTRAVENOUS at 01:23

## 2023-09-05 RX ADMIN — ATORVASTATIN CALCIUM 40 MG: 40 TABLET, FILM COATED ORAL at 16:45

## 2023-09-05 RX ADMIN — Medication 1 TABLET: at 09:31

## 2023-09-05 NOTE — PLAN OF CARE
Problem: SAFETY ADULT  Goal: Patient will remain free of falls  Description: INTERVENTIONS:  - Educate patient/family on patient safety including physical limitations  - Instruct patient to call for assistance with activity   - Consult OT/PT to assist with strengthening/mobility   - Keep Call bell within reach  - Keep bed low and locked with side rails adjusted as appropriate  - Keep care items and personal belongings within reach  - Initiate and maintain comfort rounds  - Apply yellow socks and bracelet for high fall risk patients  - Consider moving patient to room near nurses station  Outcome: Progressing     Problem: DISCHARGE PLANNING  Goal: Discharge to home or other facility with appropriate resources  Description: INTERVENTIONS:  - Identify barriers to discharge w/patient and caregiver  - Arrange for needed discharge resources and transportation as appropriate  - Identify discharge learning needs (meds, wound care, etc.)  - Arrange for interpretive services to assist at discharge as needed  - Refer to Case Management Department for coordinating discharge planning if the patient needs post-hospital services based on physician/advanced practitioner order or complex needs related to functional status, cognitive ability, or social support system  Outcome: Progressing     Problem: Knowledge Deficit  Goal: Patient/family/caregiver demonstrates understanding of disease process, treatment plan, medications, and discharge instructions  Description: Complete learning assessment and assess knowledge base.   Interventions:  - Provide teaching at level of understanding  - Provide teaching via preferred learning methods  Outcome: Progressing     Problem: NEUROSENSORY - ADULT  Goal: Achieves stable or improved neurological status  Description: INTERVENTIONS  - Monitor and report changes in neurological status  - Monitor vital signs such as temperature, blood pressure, glucose, and any other labs ordered   - Initiate measures to prevent increased intracranial pressure  - Monitor for seizure activity and implement precautions if appropriate      Outcome: Progressing

## 2023-09-05 NOTE — ASSESSMENT & PLAN NOTE
Yue Gibbons is a 34 y.o. right-handed female with no significant past medical history on no home medications who presented to [address] [place] [address] ED on 9/1/2023 as a stroke alert with acute onset bilateral blurry vision and right-sided weakness. BP on presentation 141/97. NIHSS of 1 for RUE weakness. Patient reporting acute onset blurry vision, followed by transient nose numbness which quickly resolved, and then development of metallic taste in mouth and right-sided weakness. Patient reporting blurry vision resolved within 1 day and right hemiparesis persists. Work-up:  - CT head: Unremarkable for acute intracranial abnormalities  - CTA head and neck: Unremarkable for critical stenosis or large vessel occlusion  - MRI brain and orbits w wo:  "Tiny focus of abnormal signal within the right paramedian frank which is faintly hyperintense on T2/FLAIR imaging, demonstrates subtle susceptibility on [place] imaging and punctate enhancement after administration of contrast on multiple sequences. See series 7 image 28 and series 14 image 62. Findings are nonspecific for a patient of this age and differential considerations would include a small slow flow vascular abnormality such as cavernous angioma or hereditary telangiectasia. A small subacute infarct is considered less likely given the lack of diffusion abnormality. More aggressive etiologies such as parenchymal metastasis also considered less likely given patient's age. Recommend short-term follow-up examination in approximately 1-2 months to assess change over time. "  - MRI C-spine w wo: Unremarkable    - Labs on presentation:  · Vitamin B12 on the lower end of normal, 222  · CRP slightly elevated, 6.6, repeat level on 9/3 improved to 3.8  · Vitamin D low, 15.4  · Lipid panel:  Total cholesterol 182, triglycerides elevated 157, HDL 34, LDL elevated 117  · Labs WNL: Troponin, TSH, flu/COVID/RSV magnesium, pregnancy serum negative, sed rate, total CK homocystine    MRI brain unremarkable for evidence of acute infarct, however did demonstrated abnormal signal within the right paramedian frank which appears to be faintly hyperintense on FLAIR imaging, with subtle susceptibility on [place] imaging, and with punctate enhancement. Etiology of abnormal signal unclear at this time, possible vascular abnormality.  Per inpatient ophthalmology, patient with an unremarkable eye exam other than V-pattern exotropia which may be incidental.     Plan:  - No need for LP per discussion with attending neurologist; communicated with primary team  - Pending:   · Hemoglobin A1c, SHALINI, ANCA, RF screen, Sjogren's antibodies, anti-DNA antibody, methylmalonic acid, SPEP, RPR, Lyme total AB with reflex, vitamin B6, vitamin B1, heavy metal screen, folate, ACE, heavy metals profile II  - Continue B12 supplementation  - Continue vitamin D supplementation  - Lovenox DVT prophylaxis held at this time with plan for potential LP  - PT/OT  - Medical management supportive care per primary team, notify with changes  - No further inpatient recommendations at this time, can follow-up in the outpatient setting

## 2023-09-05 NOTE — DISCHARGE SUMMARY
[facility]  Discharge- Cam Ruiz 1990, 34 y.o. female MRN: [id number]  Unit/Bed#: [facility] 711-01 Encounter: [id number]  Primary Care Provider: Lyndon Kee   Date and time admitted to hospital: 9/3/2023 10:37 PM    * Acute right-sided weakness  Assessment & Plan  Initially presented to [address] [place] [address] with acute onset bilateral blurred vision beginning at 11 AM while looking at computer while at work, then at 3 PM developed nose numbness and metallic taste in mouth followed by right-sided facial heaviness and right upper extremity weakness. Right upper extremity weakness persist at time of admission, then noted to develop right lower extremity weakness in addition to ongoing visual complaints. Neurology recommended transfer to [facility] for further workup/evaluation. • NIH 1- No TNK per neurology  • CT/CTA brain without ischemia, infarct, bleed, lesion or LVO  · MRI brain: Tiny focus of abnormal signal within the right paramedian frank. ..nonspecific for a patient of this age and differential considerations would include a small slow flow vascular abnormality such as cavernous angioma or hereditary telangiectasia. A small subacute infarct is considered less likely given the lack of diffusion abnormality. More aggressive etiologies such as parenchymal metastasis also considered less likely given patient's age.    • Lipid panel with   • Vit D low at 15.4, started on oral supplementation   • Vit B12 low-normal at 222, ordered IM and PO supplementation per neurology; follow-up B6 and B1 levels  • Appreciate ongoing neurology recommendations   o Aspirin stopped after MRI confirmed that a stroke was not the cause of her symptoms and the abnormal fining in the right frank is felt to be extremely unlikely to be a stroke.  o Negative MRI cervical spine  o Normal cardiac echocardiogram   o ESR normal, CRP mildly elevated, CK normal   o Pending labs: Methylmalonic acid, homocystine, folate, thiamine, Lyme, RPR, SHALINI with reflex testing, ANCA panel, Sjogren's panel, RF, dsDNA, SPEP, heavy metal screen  o Evaluated by ophthalmology, patient with unremarkable eye exam other than V-pattern exotropia which may be incidental  • Telemetry monitoring prior to transfer did not reveal any tachy or bradycardia arrhythmias  • Patient was evaluated by neurology at [facility] no need for LP  • Continue with B12 and vitamin D supplement  • Outpatient neurology follow-up    Acute loss of vision, bilateral  Assessment & Plan  Acute onset bilateral blurred vision as outlined above. Persisting now, particularly when trying to read patient states she cannot focus on letters/words   · Appreciate ophthalmology consult , unremarkable eye exam  · Remainder of plan as outlined above   · Improving    Medical Problems     Resolved Problems  Date Reviewed: 9/5/2023   None       Discharging Physician / Practitioner: Michele Fortune DO  PCP: Liyah Thomas  Admission Date:   Admission Orders (From admission, onward)     Ordered        09/03/23 2242  Inpatient Admission  Once                      Discharge Date: 09/05/23    Consultations During Hospital Stay:  · Neurology  · Ophthalmology    Procedures Performed:   · none    Significant Findings / Test Results:   · As above    Incidental Findings:   · none    Test Results Pending at Discharge (will require follow up):    Hemoglobin A1c, SHALINI, ANCA, RF screen, Sjogren's antibodies, anti-DNA antibody, methylmalonic acid, SPEP, RPR, Lyme total AB with reflex, vitamin B6, vitamin B1, heavy metal screen, folate, ACE, heavy metals profile II     Outpatient Tests Requested:  · Outpatient neurology follow-up    Complications:  none    Reason for Admission:   Right-sided weakness and bilateral blurred vision    Hospital Course:   Issac Liriano is a 34 y.o. female patient who originally presented to the hospital on 9/3/2023 due to right-sided weakness and bilateral blurred vision  Work-up was negative for acute CVA  Found to have vitamin D and vitamin B12 deficiency and she was started on supplement replacement  Symptoms are improving  Further serology work-up is pending  Patient was cleared by neurology to be discharged home to follow-up with neurology as an outpatient      Please see above list of diagnoses and related plan for additional information. Condition at Discharge: stable    Discharge Day Visit / Exam:   Subjective:    Patient seen and examined  Comfortable in bed  No event overnight  Anxious to go home  Vitals: Blood Pressure: 111/68 (09/05/23 1554)  Pulse: 84 (09/05/23 0745)  Temperature: 97.5 °F (36.4 °C) (09/05/23 1554)  Temp Source: Oral (09/04/23 0735)  Respirations: 16 (09/04/23 1436)  Height: 5' 6" (167.6 cm) (09/04/23 1330)  Weight - Scale: 108 kg (237 lb) (09/04/23 1330)  SpO2: 96 % (09/05/23 0745)  Exam:   Physical Exam   Patient is awake alert oriented no acute distress   Lungs clear to auscultation bilateral  Heart pulse S1-S2 no murmur  Abdomen soft nontender  Lower extremities no edema      Discussion with Family: Updated  () at bedside. Discharge instructions/Information to patient and family:   See after visit summary for information provided to patient and family. Provisions for Follow-Up Care:  See after visit summary for information related to follow-up care and any pertinent home health orders. Disposition:   Home    Planned Readmission: no     Discharge Statement:  I spent 40  minutes discharging the patient. This time was spent on the day of discharge. I had direct contact with the patient on the day of discharge. Greater than 50% of the total time was spent examining patient, answering all patient questions, arranging and discussing plan of care with patient as well as directly providing post-discharge instructions. Additional time then spent on discharge activities.     Discharge Medications:  See after visit summary for reconciled discharge medications provided to patient and/or family.       **Please Note: This note may have been constructed using a voice recognition system**

## 2023-09-05 NOTE — ASSESSMENT & PLAN NOTE
Acute onset bilateral blurred vision as outlined above.  Persisting now, particularly when trying to read patient states she cannot focus on letters/words   · Appreciate ophthalmology consult , unremarkable eye exam  · Remainder of plan as outlined above   · Improving

## 2023-09-05 NOTE — PROGRESS NOTES
Progress Note - Neurology   Renetta Mark 34 y.o. female MRN: [id number]  Unit/Bed#: [facility] 711-01 Encounter: [id number]      Assessment/Plan   * Acute right-sided weakness  Assessment & Plan  Renetta Mark is a 34 y.o. right-handed female with no significant past medical history on no home medications who presented to [address] [place] [address] ED on 9/1/2023 as a stroke alert with acute onset bilateral blurry vision and right-sided weakness. BP on presentation 141/97. NIHSS of 1 for RUE weakness. Patient reporting acute onset blurry vision, followed by transient nose numbness which quickly resolved, and then development of metallic taste in mouth and right-sided weakness. Patient reporting blurry vision resolved within 1 day and right hemiparesis persists. Work-up:  - CT head: Unremarkable for acute intracranial abnormalities  - CTA head and neck: Unremarkable for critical stenosis or large vessel occlusion  - MRI brain and orbits w wo:  "Tiny focus of abnormal signal within the right paramedian frank which is faintly hyperintense on T2/FLAIR imaging, demonstrates subtle susceptibility on [place] imaging and punctate enhancement after administration of contrast on multiple sequences. See series 7 image 28 and series 14 image 62. Findings are nonspecific for a patient of this age and differential considerations would include a small slow flow vascular abnormality such as cavernous angioma or hereditary telangiectasia. A small subacute infarct is considered less likely given the lack of diffusion abnormality. More aggressive etiologies such as parenchymal metastasis also considered less likely given patient's age. Recommend short-term follow-up examination in approximately 1-2 months to assess change over time. "  - MRI C-spine w wo:  Unremarkable    - Labs on presentation:  · Vitamin B12 on the lower end of normal, 222  · CRP slightly elevated, 6.6, repeat level on 9/3 improved to 3.8  · Vitamin D low, 15.4  · Lipid panel: Total cholesterol 182, triglycerides elevated 157, HDL 34, LDL elevated 117  · Labs WNL: Troponin, TSH, flu/COVID/RSV magnesium, pregnancy serum negative, sed rate, total CK homocystine    MRI brain unremarkable for evidence of acute infarct, however did demonstrated abnormal signal within the right paramedian frank which appears to be faintly hyperintense on FLAIR imaging, with subtle susceptibility on [place] imaging, and with punctate enhancement. Etiology of abnormal signal unclear at this time, possible vascular abnormality. Per inpatient ophthalmology, patient with an unremarkable eye exam other than V-pattern exotropia which may be incidental.     Plan:  - No need for LP per discussion with attending neurologist; communicated with primary team  - Pending:   · Hemoglobin A1c, SHALINI, ANCA, RF screen, Sjogren's antibodies, anti-DNA antibody, methylmalonic acid, SPEP, RPR, Lyme total AB with reflex, vitamin B6, vitamin B1, heavy metal screen, folate, ACE, heavy metals profile II  - Continue B12 supplementation  - Continue vitamin D supplementation  - Lovenox DVT prophylaxis held at this time with plan for potential LP  - PT/OT  - Medical management supportive care per primary team, notify with changes  - No further inpatient recommendations at this time, can follow-up in the outpatient setting    Bertin Fears will need follow up in 6-8 weeks with neuromuscular attending. She will not require outpatient neurological testing. Subjective: Today patient reports that her visual symptoms have resolved and admits to being back at her baseline in regards to her vision. Patient states that she continues to have right upper and lower extremity weakness, which has improved since presentation. Denies chest pain, shortness of breath, abdominal pain, nausea, vomiting, headache, worsening weakness, new numbness/tingling. ROS:  12 point ROS performed, as stated above, all others negative.     Vitals: Blood pressure 105/64, pulse 84, temperature 97.8 °F (36.6 °C), resp. rate 16, height 5' 6" (1.676 m), weight 108 kg (237 lb), last menstrual period 08/20/2023, SpO2 96 %. ,Body mass index is 38.25 kg/m². Physical Exam  Vitals and nursing note reviewed. Constitutional:       General: She is not in acute distress. Appearance: Normal appearance. She is not ill-appearing, toxic-appearing or diaphoretic. HENT:      Head: Normocephalic and atraumatic. Eyes:      General: No scleral icterus. Right eye: No discharge. Left eye: No discharge. Extraocular Movements: EOM normal.      Conjunctiva/sclera: Conjunctivae normal.      Pupils: Pupils are equal, round, and reactive to light. Musculoskeletal:      Cervical back: Normal range of motion and neck supple. Skin:     General: Skin is warm and dry. Coloration: Skin is not jaundiced or pale. Neurological:      Mental Status: She is alert. Psychiatric:         Mood and Affect: Mood normal.         Behavior: Behavior normal.         Thought Content: Thought content normal.         Judgment: Judgment normal.       Neurologic Exam     Mental Status   Patient is alert, sitting up in bed, accompanied by . Oriented x 3. No dysarthria or aphasia noted. Able to follow central and appendicular commands. Able to name all object provided. Answers all questions appropriately. Cranial Nerves     CN II   Visual fields full to confrontation. CN III, IV, VI   Pupils are equal, round, and reactive to light. Extraocular motions are normal.   Nystagmus: none   Upgaze: normal  Downgaze: normal  Conjugate gaze: present    CN V   Facial sensation intact. CN VII   Facial expression full, symmetric.      CN VIII   Hearing: intact    CN XI   CN XI normal.     CN XII   Tongue deviation: none    Motor Exam   Right upper extremity drift first, followed by minimal pronation  Right  4/5, left 5/5  Bilateral biceps, triceps strength 5/5  Right deltoid strength 4/5 with giveaway, effort limited  Left deltoid strength 5/5    Right hip flexion strength 4+/5 with giveaway, effort limited  Left hip flexion strength 5/5  Right dorsiflexion able to give 5/5, with giveaway  Right plantarflexion strength 5/5  Left dorsiflexion and plantarflexion strength 5/5     Sensory Exam   Sensation to light touch diminished in right upper and lower extremity     Gait, Coordination, and Reflexes     Tremor   Resting tremor: absent  No ataxia or dysmetria bilateral upper extremity finger-nose testing    No involuntary movements or rhythmic seizure-like activity noted throughout exam     Lab Results: I have personally reviewed pertinent reports. No results found for this or any previous visit (from the past 24 hour(s)). ]  Imaging Studies: I have personally reviewed pertinent reports and I have personally reviewed pertinent films in PACS. EKG, Pathology, and Other Studies: I have personally reviewed pertinent reports. VTE Prophylaxis: Enoxaparin (Lovenox)    Counseling / Coordination of Care  Total time spent today 36 minutes. Greater than 50% of total time was spent with the patient and/or family counseling and/or coordination of care. A description of the counseling/coordination of care:  Patient was seen and evaluated. Discussed with attending. Chart reviewed thoroughly including laboratory and imaging studies. Plan of care discussed with patient and primary team.  Discussed MRI brain results with the patient. Dictation voice to text software has been used in the creation of this document. Please consider this in light of any contextual or grammatical errors.

## 2023-09-05 NOTE — PHYSICAL THERAPY NOTE
Physical Therapy Progress Note     09/05/23 1040   PT Last Visit   PT Visit Date 09/05/23   Note Type   Note Type Treatment   Pain Assessment   Pain Assessment Tool 0-10   Pain Score No Pain   Restrictions/Precautions   Other Precautions Fall Risk   Subjective   Subjective The patient is eager to get better, and to find the cause of her issues. Bed Mobility   Supine to Sit 7  Independent   Sit to Supine 7  Independent   Transfers   Sit to Stand 7  Independent   Stand to Sit 7  Independent   Ambulation/Elevation   Gait pattern Excessively slow;Decreased foot clearance;Decreased heel strike   Gait Assistance 5  Supervision   Additional items Verbal cues   Assistive Device None   Distance 220 feet. Stair Management Assistance Not tested  (Anticipate no difficulty.)   Balance   Static Sitting Normal   Dynamic Sitting Normal   Static Standing Good   Ambulatory Good   Activity Tolerance   Activity Tolerance Patient tolerated treatment well   Nurse Made Aware Yes. Assessment   Prognosis Good   Problem List Decreased strength;Decreased mobility; Decreased endurance; Impaired balance   Assessment Today the patient is demonstrating notable improvement as she is ambulating beyond community distances with no device without assistance. She does continue to have some weakness in her RLE, but this is improved from yesterday's session with no foot drag and a flat-foot placement throughout. She was able to manuever around obstacles as well as perform turns while compensating for them appropriately. She had no losses of balance, and she demonstrates keen awareness of her current deficits. Educated her in the importance of energy conservation as well as gradually progressing her activity at home. She has exceptional family support, and she would do well at outpatient therapies at this time. She may benefit from a walker if she wishes at this time as it will improve her activity tolerance.    Barriers to Discharge None   Goals Patient Goals To get back to her kids and riding her horse. STG Expiration Date 09/16/23   PT Treatment Day 2   Plan   Treatment/Interventions Functional transfer training;Elevations; Patient/family training;Gait training   Progress Progressing toward goals   PT Frequency 3-5x/wk   Recommendation   PT Discharge Recommendation Home with outpatient rehabilitation   AM-PAC Basic Mobility Inpatient   Turning in Flat Bed Without Bedrails 4   Lying on Back to Sitting on Edge of Flat Bed Without Bedrails 4   Moving Bed to Chair 4   Standing Up From Chair Using Arms 4   Walk in Room 3   Climb 3-5 Stairs With Railing 3   Basic Mobility Inpatient Raw Score 22   Basic Mobility Standardized Score 47.4   Highest Level Of Mobility   JH-HLM Goal 7: Walk 25 feet or more   JH-HLM Achieved 7: Walk 25 feet or more         An AM-PAC Basic Mobility raw score less than 16 suggests the patient may benefit from discharge to post-acute rehab services.     Ana Triplett, PTA

## 2023-09-05 NOTE — ASSESSMENT & PLAN NOTE
Initially presented to [address] [place] [address] with acute onset bilateral blurred vision beginning at 11 AM while looking at computer while at work, then at 3 PM developed nose numbness and metallic taste in mouth followed by right-sided facial heaviness and right upper extremity weakness. Right upper extremity weakness persist at time of admission, then noted to develop right lower extremity weakness in addition to ongoing visual complaints. Neurology recommended transfer to [facility] for further workup/evaluation. • NIH 1- No TNK per neurology  • CT/CTA brain without ischemia, infarct, bleed, lesion or LVO  · MRI brain: Tiny focus of abnormal signal within the right paramedian frank. ..nonspecific for a patient of this age and differential considerations would include a small slow flow vascular abnormality such as cavernous angioma or hereditary telangiectasia. A small subacute infarct is considered less likely given the lack of diffusion abnormality. More aggressive etiologies such as parenchymal metastasis also considered less likely given patient's age.    • Lipid panel with   • Vit D low at 15.4, started on oral supplementation   • Vit B12 low-normal at 222, ordered IM and PO supplementation per neurology; follow-up B6 and B1 levels  • Appreciate ongoing neurology recommendations   o Aspirin stopped after MRI confirmed that a stroke was not the cause of her symptoms and the abnormal fining in the right frank is felt to be extremely unlikely to be a stroke.  o Negative MRI cervical spine  o Normal cardiac echocardiogram   o ESR normal, CRP mildly elevated, CK normal   o Pending labs: Methylmalonic acid, homocystine, folate, thiamine, Lyme, RPR, SHALINI with reflex testing, ANCA panel, Sjogren's panel, RF, dsDNA, SPEP, heavy metal screen  o Evaluated by ophthalmology, patient with unremarkable eye exam other than V-pattern exotropia which may be incidental  • Telemetry monitoring prior to transfer did not reveal any tachy or bradycardia arrhythmias  • Patient was evaluated by neurology at [facility] no need for LP  • Continue with B12 and vitamin D supplement  • Outpatient neurology follow-up

## 2023-09-05 NOTE — PLAN OF CARE
Problem: PHYSICAL THERAPY ADULT  Goal: Performs mobility at highest level of function for planned discharge setting. See evaluation for individualized goals. Description: Treatment/Interventions: ADL retraining, Functional transfer training, LE strengthening/ROM, Elevations, Therapeutic exercise, Endurance training, Patient/family training, Cognitive reorientation, Equipment eval/education, Bed mobility, Gait training, Compensatory technique education, Continued evaluation, Spoke to MD, Spoke to nursing, Spoke to case management  Equipment Recommended:  (TBD w/ progress closer to d/c- RW for now)       See flowsheet documentation for full assessment, interventions and recommendations. Outcome: Adequate for Discharge  Note: Prognosis: Good  Problem List: Decreased strength, Decreased mobility, Decreased endurance, Impaired balance  Assessment: Today the patient is demonstrating notable improvement as she is ambulating beyond community distances with no device without assistance. She does continue to have some weakness in her RLE, but this is improved from yesterday's session with no foot drag and a flat-foot placement throughout. She was able to manuever around obstacles as well as perform turns while compensating for them appropriately. She had no losses of balance, and she demonstrates keen awareness of her current deficits. Educated her in the importance of energy conservation as well as gradually progressing her activity at home. She has exceptional family support, and she would do well at outpatient therapies at this time. She may benefit from a walker if she wishes at this time as it will improve her activity tolerance. Barriers to Discharge: None     PT Discharge Recommendation: Home with outpatient rehabilitation    See flowsheet documentation for full assessment.

## 2023-09-05 NOTE — UTILIZATION REVIEW
NOTIFICATION OF ADMISSION DISCHARGE   This is a Notification of Discharge from [facility]. Please be advised that this patient has been discharge from our facility. Below you will find the admission and discharge date and time including the patient’s disposition. UTILIZATION REVIEW CONTACT:  Vik Lind  Utilization   Network Utilization Review Department  Phone: [phone] x carefully listen to the prompts. All voicemails are confidential.  Email: [email or web address]     ADMISSION INFORMATION  PRESENTATION DATE: 9/1/2023  6:02 PM  OBERVATION ADMISSION DATE:   INPATIENT ADMISSION DATE: 9/3/23  2:28 PM   DISCHARGE DATE: 9/3/2023  9:57 PM   DISPOSITION:[facility]    IMPORTANT INFORMATION:  Send all requests for admission clinical reviews, approved or denied determinations and any other requests to dedicated fax number below belonging to the campus where the patient is receiving treatment.  List of dedicated fax numbers:  [place] DENIALS (Administrative/Medical Necessity) [phone] [address] [facility] [phone]   [facility] [phone]   [facility] [phone]   [address] [facility] [phone]   [address] [place] [address] [phone]   [facility] [phone]   [facility] [address] [place] [phone]   [address] [place] [address] [phone]   [address] [facility] [phone]   [address] [facility] [address] [facility] [phone]

## 2023-09-06 LAB
ALBUMIN SERPL ELPH-MCNC: 3.77 G/DL (ref 3.2–5.1)
ALBUMIN SERPL ELPH-MCNC: 59.9 % (ref 48–70)
ALPHA1 GLOB SERPL ELPH-MCNC: 0.25 G/DL (ref 0.15–0.47)
ALPHA1 GLOB SERPL ELPH-MCNC: 4 % (ref 1.8–7)
ALPHA2 GLOB SERPL ELPH-MCNC: 0.71 G/DL (ref 0.42–1.04)
ALPHA2 GLOB SERPL ELPH-MCNC: 11.3 % (ref 5.9–14.9)
BETA GLOB ABNORMAL SERPL ELPH-MCNC: 0.37 G/DL (ref 0.31–0.57)
BETA1 GLOB SERPL ELPH-MCNC: 5.9 % (ref 4.7–7.7)
BETA2 GLOB SERPL ELPH-MCNC: 4.7 % (ref 3.1–7.9)
BETA2+GAMMA GLOB SERPL ELPH-MCNC: 0.3 G/DL (ref 0.2–0.58)
DSDNA AB SER-ACNC: <1 IU/ML (ref 0–9)
ENA SS-A AB SER-ACNC: <0.2 AI (ref 0–0.9)
ENA SS-B AB SER-ACNC: <0.2 AI (ref 0–0.9)
GAMMA GLOB ABNORMAL SERPL ELPH-MCNC: 0.89 G/DL (ref 0.4–1.66)
GAMMA GLOB SERPL ELPH-MCNC: 14.2 % (ref 6.9–22.3)
IGG/ALB SER: 1.49 {RATIO} (ref 1.1–1.8)
PROT PATTERN SERPL ELPH-IMP: ABNORMAL
PROT SERPL-MCNC: 6.3 G/DL (ref 6.4–8.4)

## 2023-09-06 PROCEDURE — 84165 PROTEIN E-PHORESIS SERUM: CPT | Performed by: STUDENT IN AN ORGANIZED HEALTH CARE EDUCATION/TRAINING PROGRAM

## 2023-09-06 NOTE — UTILIZATION REVIEW
NOTIFICATION OF INPATIENT ADMISSION   AUTHORIZATION REQUEST   SERVICING FACILITY:   [address] [place] [address]  Address: [address] [place] [address]  Tax ID: [id number]  NPI: [id number] ATTENDING PROVIDER:  Attending Name and NPI#: Alejandra Zhang [[id number]]  Address: [address] [place] [address]  Phone: [phone]   ADMISSION INFORMATION:  Place of Service: Inpatient [address] [facility]  Place of Service Code: 21  Inpatient Admission Date/Time: 9/3/23 10:37 PM  Discharge Date/Time: 9/5/2023  6:37 PM  Admitting Diagnosis Code/Description:  Stroke-like symptoms     UTILIZATION REVIEW CONTACT:  [place] Utilization   Network Utilization Review Department  Phone: [phone]  Fax: [phone]  Email: [email or web address]  Contact for approvals/pending authorizations, clinical reviews, and discharge. PHYSICIAN ADVISORY SERVICES:  Medical Necessity Denial & [employer] Review  Phone: [phone]  Fax: [phone]  Email: [email or web address]

## 2023-09-07 LAB
ARSENIC BLD-MCNC: 1 UG/L (ref 0–9)
C-ANCA TITR SER IF: NORMAL TITER
CADMIUM BLD-MCNC: <0.5 UG/L (ref 0–1.2)
LEAD BLD-MCNC: <1 UG/DL (ref 0–3.4)
MERCURY BLD-MCNC: <1 UG/L (ref 0–14.9)
METHYLMALONATE SERPL-SCNC: 204 NMOL/L (ref 0–378)
MYELOPEROXIDASE AB SER IA-ACNC: <0.2 UNITS (ref 0–0.9)
P-ANCA ATYPICAL TITR SER IF: NORMAL TITER
P-ANCA TITR SER IF: NORMAL TITER
PROTEINASE3 AB SER IA-ACNC: <0.2 UNITS (ref 0–0.9)
VIT B6 SERPL-MCNC: 7.5 UG/L (ref 3.4–65.2)

## 2023-09-08 LAB
MISCELLANEOUS LAB TEST RESULT: NORMAL
VIT B1 BLD-SCNC: 127.3 NMOL/L (ref 66.5–200)

## 2023-10-11 ENCOUNTER — TELEPHONE (OUTPATIENT)
Dept: NEUROLOGY | Facility: CLINIC | Age: 33
End: 2023-10-11

## 2023-10-11 NOTE — TELEPHONE ENCOUNTER
1ST ATTEMPT,     Called pt no answer, LMOM. Julio Cesar Message Sent    Letter also mailed    Thank you,     Maday Iqbal LOG IN - 09/23/23- Sent a [address] HFU/ [facility]/ ACUTE RT SIDED [id number] [facility] - 9/5/2023    Ana Laura Cintron will need follow up in 6-8 weeks with neuromuscular attending. She will not require outpatient neurological testing.

## 2025-02-20 ENCOUNTER — APPOINTMENT (OUTPATIENT)
Dept: RADIOLOGY | Facility: CLINIC | Age: 35
End: 2025-02-20
Payer: COMMERCIAL

## 2025-02-20 ENCOUNTER — OFFICE VISIT (OUTPATIENT)
Dept: URGENT CARE | Facility: CLINIC | Age: 35
End: 2025-02-20
Payer: COMMERCIAL

## 2025-02-20 VITALS
SYSTOLIC BLOOD PRESSURE: 118 MMHG | BODY MASS INDEX: 29.8 KG/M2 | RESPIRATION RATE: 16 BRPM | DIASTOLIC BLOOD PRESSURE: 80 MMHG | HEIGHT: 72 IN | HEART RATE: 106 BPM | OXYGEN SATURATION: 96 % | WEIGHT: 220 LBS | TEMPERATURE: 97.6 F
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
## 2025-02-20 DIAGNOSIS — S63.501A SPRAIN OF RIGHT WRIST, INITIAL ENCOUNTER: Primary | ICD-10-CM

## 2025-02-20 PROCEDURE — 99203 OFFICE O/P NEW LOW 30 MIN: CPT | Performed by: PHYSICIAN ASSISTANT

## 2025-02-20 PROCEDURE — 73110 X-RAY EXAM OF WRIST: CPT

## 2025-02-20 NOTE — PATIENT INSTRUCTIONS
Tylenol/Ibuprofen for pain  Wear brace for support (Remove braces and ACE bandages every 3 hours)  Ice 20 minutes 3-4 times per day for 3 days  Insulate the skin from the ice to prevent frostbite  Rest and Elevate  Follow up with orthopedic if symptoms do not improve  Follow up with PCP in 3-5 days.  Proceed to  ER if symptoms worsen.    If tests have been performed at [facility], our office will contact you with results if changes need to be made to the care plan discussed with you at the visit.  You can review your full results on [facility] MyChart.    Remove splint/brace and go straight to ER if you experience sudden increase in pain, swelling, change in color/temperature/sensation, chest pain, shortness or breath, or if you start coughing up blood.

## 2025-02-20 NOTE — LETTER
February 20, 2025     Patient: Ana Laura Cintron   YOB: 1990   Date of Visit: 2/20/2025       To Whom It May Concern:    It is my medical opinion that Ana Laura Cintron may return to work on 2/24/2025 or sooner if symptoms improve .    If you have any questions or concerns, please don't hesitate to call.         Sincerely,        Hermelinda Sharma PA-C    CC: No Recipients

## 2025-02-20 NOTE — PROGRESS NOTES
[facility]        NAME: Ana Laura Cintron is a 35 y.o. female  : 1990    MRN: [id number]  DATE: 2025  TIME: 6:46 PM    Assessment and Plan   Sprain of right wrist, initial encounter [S63.501A]  1. Sprain of right wrist, initial encounter  XR wrist 3+ vw right    Ambulatory referral to Orthopedic Surgery    Brace          Results  XR provider read: no acute fracture or dislocation. Possible lytic lesion noted on scaphoid.     DME brace applied and paperwork completed.      Patient Instructions     Assessment & Plan    Tylenol/Ibuprofen for pain  Wear brace for support (Remove braces and ACE bandages every 3 hours)  Ice 20 minutes 3-4 times per day for 3 days  Insulate the skin from the ice to prevent frostbite  Rest and Elevate  Follow up with orthopedic if symptoms do not improve  Follow up with PCP in 3-5 days.  Proceed to  ER if symptoms worsen.    If tests have been performed at [facility], our office will contact you with results if changes need to be made to the care plan discussed with you at the visit.  You can review your full results on [facility] MyChart.    Remove splint/brace and go straight to ER if you experience sudden increase in pain, swelling, change in color/temperature/sensation, chest pain, shortness or breath, or if you start coughing up blood.      Chief Complaint     Chief Complaint   Patient presents with    Wrist Injury     Fell 1 day ago onto outstreched right wrist. Decreased rom since         History of Present Illness     History of Present Illness      No prior injury.     Wrist Pain   Pain location: R wrist. This is a new problem. The current episode started yesterday. There has been a history of trauma (FOOSH). The pain is moderate. Pertinent negatives include no limited range of motion, numbness or tingling. The symptoms are aggravated by activity. She has tried acetaminophen for the symptoms.       Review of Systems   Review of Systems   Musculoskeletal:   Negative for joint swelling.   Skin:  Negative for color change.   Neurological:  Negative for tingling, weakness and numbness.         Current Medications       Current Outpatient Medications:     cholecalciferol (VITAMIN D3) 1,000 units tablet, Take 2 tablets (2,000 Units total) by mouth daily Do not start before September 6, 2023., Disp: 60 tablet, Rfl: 0    cyanocobalamin (VITAMIN B-12) 1000 MCG tablet, Take 1 tablet (1,000 mcg total) by mouth daily Do not start before September 7, 2023., Disp: 30 tablet, Rfl: 0    Current Allergies     Allergies as of 02/20/2025 - Reviewed 02/20/2025   Allergen Reaction Noted    Latex Swelling and Rash 09/01/2023            The following portions of the patient's history were reviewed and updated as appropriate: allergies, current medications, past family history, past medical history, past social history, past surgical history and problem list.     Past Medical History:   Diagnosis Date    Concussion        Past Surgical History:   Procedure Laterality Date    KNEE ARTHROSCOPY         Family History   Adopted: Yes   Problem Relation Age of Onset    No Known Problems Mother          Medications have been verified.        Objective   /80   Pulse (!) 106   Temp 97.6 °F (36.4 °C)   Resp 16   Ht 6' (1.829 m)   Wt 99.8 kg (220 lb)   LMP 01/30/2025   SpO2 96%   BMI 29.84 kg/m²   Patient's last menstrual period was 01/30/2025.       Physical Exam     Physical Exam  Vitals reviewed.   Constitutional:       General: She is not in acute distress.     Appearance: She is well-developed.   Cardiovascular:      Rate and Rhythm: Normal rate and regular rhythm.      Pulses: Normal pulses.      Heart sounds: Normal heart sounds. No murmur heard.     No friction rub. No gallop.   Pulmonary:      Effort: Pulmonary effort is normal. No respiratory distress.      Breath sounds: Normal breath sounds.   Musculoskeletal:         General: Tenderness (R distal ulna and proximal 5th  metacarpal) present. No swelling or deformity. Normal range of motion.   Skin:     Capillary Refill: Capillary refill takes less than 2 seconds.      Findings: No bruising or erythema.   Neurological:      Mental Status: She is alert and oriented to person, place, and time.      Sensory: No sensory deficit.      Deep Tendon Reflexes: Reflexes are normal and symmetric.   Psychiatric:         Behavior: Behavior normal.         Thought Content: Thought content normal.         Judgment: Judgment normal.

## 2025-02-21 ENCOUNTER — RESULTS FOLLOW-UP (OUTPATIENT)
Dept: URGENT CARE | Facility: CLINIC | Age: 35
End: 2025-02-21

## 2025-02-24 ENCOUNTER — OFFICE VISIT (OUTPATIENT)
Dept: OBGYN CLINIC | Facility: CLINIC | Age: 35
End: 2025-02-24
Payer: COMMERCIAL

## 2025-02-24 VITALS — BODY MASS INDEX: 35.36 KG/M2 | HEIGHT: 66 IN | WEIGHT: 220 LBS

## 2025-02-24 DIAGNOSIS — S63.501A SPRAIN OF RIGHT WRIST, INITIAL ENCOUNTER: ICD-10-CM

## 2025-02-24 PROCEDURE — 99204 OFFICE O/P NEW MOD 45 MIN: CPT | Performed by: ORTHOPAEDIC SURGERY

## 2025-02-24 NOTE — PROGRESS NOTES
Assessment/Plan:  Assessment & Plan   Diagnoses and all orders for this visit:    Sprain of right wrist, initial encounter  -     Ambulatory referral to Orthopedic Surgery        Plan: I would recommend follow-up in 10 days.  She is to continue with a cock-up wrist brace worn full-time, removed only for cleansing.  Physical therapy/Occupational Therapy was discussed but she did not feel the need to pursue formal therapy at this time.  I have encouraged her to contact me if questions or concerns arise.    Subjective:   Patient ID: Ana Laura Cintron is a 35 y.o. female.    MIGUEL ANGEL Du is a 35-year-old right-hand-dominant female who slipped on ice injuring her right wrist on 2/20/2025.  She fell toward her right with full impact to the outstretched right upper extremity.  She was [facility] at the [facility] on 2/20/2025, x-rays were obtained and she was placed into a cock up wrist brace.  She denies any significant change in symptoms at this time.  She complains of ulnar-sided wrist pain.  She denies history of prior injuries.  She denies paresthesias.  She denies additional injuries.    The following portions of the patient's history were reviewed and updated as appropriate: allergies, current medications, past family history, past medical history, past social history, past surgical history, and problem list.    Review of Systems: The patient's 10 organ system review is negative excluding the chief complaint    Objective:  Ortho Exam  The right wrist exam demonstrates the cock up wrist brace in place upon arrival.  This was removed without difficulty.  There is no deformity, swelling, ecchymosis or abrasions/lacerations noted.  She has excellent range of motion although she does lack about 10 degrees of terminal dorsiflexion and volar flexion in comparison to the contralateral left and complains of pain with end range of motion.  She has full forearm rotation but does complain of ulnar-sided wrist pain with  supination.  The DRUJ is stable with shuck but she does complain of pain.  She has tenderness to palpation of the TFCC.  She has excellent strength but does complain of pain with resisted dorsiflexion and volar flexion.  She has pain with resisted ulnar deviation, as well but no pain with resisted radial deviation.  The remainder of the hand and upper extremity examination is benign.  Physical Exam    X-rays of the wrist obtained on 2/20/2025 demonstrated no acute abnormalities.  There is a cyst within the scaphoid.  There are no significant degenerative changes noted and no evidence of fracture.    The x-ray report was reviewed.    The [facility] note was reviewed.